# Patient Record
Sex: FEMALE | Race: WHITE | NOT HISPANIC OR LATINO | URBAN - NONMETROPOLITAN AREA
[De-identification: names, ages, dates, MRNs, and addresses within clinical notes are randomized per-mention and may not be internally consistent; named-entity substitution may affect disease eponyms.]

---

## 2021-02-24 ENCOUNTER — LAB (OUTPATIENT)
Dept: LAB | Facility: HOSPITAL | Age: 31
End: 2021-02-24

## 2021-02-24 ENCOUNTER — OFFICE VISIT (OUTPATIENT)
Dept: ENDOCRINOLOGY | Facility: CLINIC | Age: 31
End: 2021-02-24

## 2021-02-24 VITALS
HEART RATE: 69 BPM | SYSTOLIC BLOOD PRESSURE: 100 MMHG | DIASTOLIC BLOOD PRESSURE: 68 MMHG | WEIGHT: 168.4 LBS | OXYGEN SATURATION: 99 % | BODY MASS INDEX: 27.06 KG/M2 | HEIGHT: 66 IN

## 2021-02-24 DIAGNOSIS — R00.2 PALPITATION: ICD-10-CM

## 2021-02-24 DIAGNOSIS — R53.83 OTHER FATIGUE: ICD-10-CM

## 2021-02-24 DIAGNOSIS — R94.6 ABNORMAL THYROID FUNCTION TEST: Primary | ICD-10-CM

## 2021-02-24 LAB
ALBUMIN SERPL-MCNC: 4.3 G/DL (ref 3.5–5.2)
ALBUMIN/GLOB SERPL: 1.3 G/DL
ALP SERPL-CCNC: 68 U/L (ref 39–117)
ALT SERPL W P-5'-P-CCNC: 57 U/L (ref 1–33)
ANION GAP SERPL CALCULATED.3IONS-SCNC: 12.4 MMOL/L (ref 5–15)
AST SERPL-CCNC: 36 U/L (ref 1–32)
BASOPHILS # BLD AUTO: 0.03 10*3/MM3 (ref 0–0.2)
BASOPHILS NFR BLD AUTO: 0.3 % (ref 0–1.5)
BILIRUB SERPL-MCNC: 0.6 MG/DL (ref 0–1.2)
BUN SERPL-MCNC: 10 MG/DL (ref 6–20)
BUN/CREAT SERPL: 14.5 (ref 7–25)
CALCIUM SPEC-SCNC: 9.1 MG/DL (ref 8.6–10.5)
CHLORIDE SERPL-SCNC: 103 MMOL/L (ref 98–107)
CO2 SERPL-SCNC: 23.6 MMOL/L (ref 22–29)
CREAT SERPL-MCNC: 0.69 MG/DL (ref 0.57–1)
DEPRECATED RDW RBC AUTO: 38 FL (ref 37–54)
EOSINOPHIL # BLD AUTO: 0.13 10*3/MM3 (ref 0–0.4)
EOSINOPHIL NFR BLD AUTO: 1.5 % (ref 0.3–6.2)
ERYTHROCYTE [DISTWIDTH] IN BLOOD BY AUTOMATED COUNT: 13.3 % (ref 12.3–15.4)
GFR SERPL CREATININE-BSD FRML MDRD: 99 ML/MIN/1.73
GLOBULIN UR ELPH-MCNC: 3.2 GM/DL
GLUCOSE SERPL-MCNC: 87 MG/DL (ref 65–99)
HCT VFR BLD AUTO: 39.7 % (ref 34–46.6)
HGB BLD-MCNC: 13.2 G/DL (ref 12–15.9)
IMM GRANULOCYTES # BLD AUTO: 0.02 10*3/MM3 (ref 0–0.05)
IMM GRANULOCYTES NFR BLD AUTO: 0.2 % (ref 0–0.5)
LYMPHOCYTES # BLD AUTO: 2.3 10*3/MM3 (ref 0.7–3.1)
LYMPHOCYTES NFR BLD AUTO: 26.2 % (ref 19.6–45.3)
MCH RBC QN AUTO: 26.3 PG (ref 26.6–33)
MCHC RBC AUTO-ENTMCNC: 33.2 G/DL (ref 31.5–35.7)
MCV RBC AUTO: 79.2 FL (ref 79–97)
MONOCYTES # BLD AUTO: 0.63 10*3/MM3 (ref 0.1–0.9)
MONOCYTES NFR BLD AUTO: 7.2 % (ref 5–12)
NEUTROPHILS NFR BLD AUTO: 5.67 10*3/MM3 (ref 1.7–7)
NEUTROPHILS NFR BLD AUTO: 64.6 % (ref 42.7–76)
NRBC BLD AUTO-RTO: 0 /100 WBC (ref 0–0.2)
PLATELET # BLD AUTO: 252 10*3/MM3 (ref 140–450)
PMV BLD AUTO: 12.1 FL (ref 6–12)
POTASSIUM SERPL-SCNC: 3.8 MMOL/L (ref 3.5–5.2)
PROT SERPL-MCNC: 7.5 G/DL (ref 6–8.5)
RBC # BLD AUTO: 5.01 10*6/MM3 (ref 3.77–5.28)
SODIUM SERPL-SCNC: 139 MMOL/L (ref 136–145)
T3 SERPL-MCNC: 184 NG/DL (ref 80–200)
T4 FREE SERPL-MCNC: 1.92 NG/DL (ref 0.93–1.7)
T4 SERPL-MCNC: 9.25 MCG/DL (ref 4.5–11.7)
TSH SERPL DL<=0.05 MIU/L-ACNC: <0.005 UIU/ML (ref 0.27–4.2)
WBC # BLD AUTO: 8.78 10*3/MM3 (ref 3.4–10.8)

## 2021-02-24 PROCEDURE — 36415 COLL VENOUS BLD VENIPUNCTURE: CPT | Performed by: NURSE PRACTITIONER

## 2021-02-24 PROCEDURE — 86376 MICROSOMAL ANTIBODY EACH: CPT | Performed by: NURSE PRACTITIONER

## 2021-02-24 PROCEDURE — 84439 ASSAY OF FREE THYROXINE: CPT | Performed by: NURSE PRACTITIONER

## 2021-02-24 PROCEDURE — 84480 ASSAY TRIIODOTHYRONINE (T3): CPT | Performed by: NURSE PRACTITIONER

## 2021-02-24 PROCEDURE — 80053 COMPREHEN METABOLIC PANEL: CPT | Performed by: NURSE PRACTITIONER

## 2021-02-24 PROCEDURE — 83520 IMMUNOASSAY QUANT NOS NONAB: CPT | Performed by: NURSE PRACTITIONER

## 2021-02-24 PROCEDURE — 99204 OFFICE O/P NEW MOD 45 MIN: CPT | Performed by: NURSE PRACTITIONER

## 2021-02-24 PROCEDURE — 84443 ASSAY THYROID STIM HORMONE: CPT | Performed by: NURSE PRACTITIONER

## 2021-02-24 PROCEDURE — 84445 ASSAY OF TSI GLOBULIN: CPT | Performed by: NURSE PRACTITIONER

## 2021-02-24 PROCEDURE — 85025 COMPLETE CBC W/AUTO DIFF WBC: CPT | Performed by: NURSE PRACTITIONER

## 2021-02-24 PROCEDURE — 84436 ASSAY OF TOTAL THYROXINE: CPT | Performed by: NURSE PRACTITIONER

## 2021-02-24 RX ORDER — METHIMAZOLE 5 MG/1
5 TABLET ORAL DAILY
Qty: 30 TABLET | Refills: 11 | Status: SHIPPED | OUTPATIENT
Start: 2021-02-24 | End: 2021-03-25 | Stop reason: SDUPTHER

## 2021-02-24 RX ORDER — METOPROLOL SUCCINATE 25 MG/1
25 TABLET, EXTENDED RELEASE ORAL DAILY
COMMUNITY
Start: 2020-12-28 | End: 2021-03-25 | Stop reason: SDUPTHER

## 2021-02-24 NOTE — PROGRESS NOTES
"Chief Complaint  Hypothyroidism    Subjective          Sophie Ashraf presents to [facility] ENDOCRINOLOGY  History of Present Illness     In office visit     Referring provider Can Schuster MD       30 year old female presents for consultation       Reason abnormal thyroid function studies    Timing constant    Quality not controlled    Duration is December 2020    Context patient presented with heart palpitations that were going on for several weeks      Current symptoms--heart fluttering, fatigue       Quantity    December 28, 2020    TSH is less than 0.01    Free T4 is 2.5    Aggravating factors--none    Alleviating factors none    She was started on metoprolol by the urgent care        Review of Systems   Constitutional: Positive for fatigue.   Cardiovascular: Positive for palpitations.     Objective   Vital Signs:   /68   Pulse 69   Ht 167.6 cm (66\")   Wt 76.4 kg (168 lb 6.4 oz)   SpO2 99%   BMI 27.18 kg/m²     Physical Exam  Constitutional:       Appearance: Normal appearance.   HENT:      Head: Normocephalic.      Right Ear: External ear normal.      Left Ear: External ear normal.      Nose: Nose normal.   Eyes:      Conjunctiva/sclera: Conjunctivae normal.      Pupils: Pupils are equal, round, and reactive to light.   Neck:      Musculoskeletal: Neck supple.   Cardiovascular:      Rate and Rhythm: Regular rhythm.      Heart sounds: Normal heart sounds.   Pulmonary:      Breath sounds: Normal breath sounds.   Musculoskeletal: Normal range of motion.   Skin:     Coloration: Skin is not pale.   Neurological:      General: No focal deficit present.      Mental Status: She is alert.   Psychiatric:         Mood and Affect: Mood normal.         Thought Content: Thought content normal.        Result Review :   The following data was reviewed by: SANJANA Russo on 02/24/2021:                Assessment and Plan    Diagnoses and all orders for this visit:    1. " Abnormal thyroid function test (Primary)  -     T4, Free  -     TSH  -     Thyroid Peroxidase Antibody  -     Thyroid Stimulating Immunoglobulin  -     Thyrotropin Receptor Antibody  -     T4  -     T3  -     CBC & Differential  -     Comprehensive Metabolic Panel    2. Other fatigue    3. Palpitation             In summary Ms. Ashraf is a pleasant 30 year old female found to have abnormal thyroid function studies and has been referred for further evaluation.    PLAN:     Abnormal thyroid function studies    Diagnosis includes Graves disease     Repeat labs today TSH, free T4, T3, TSI and TPO ab    We discussed started methimazole     Side effects including possible liver affection and aplastic anemia discussed. Stop medication and call the office or go to the ER if fever, sore throat, RUQ pain, and yellowing of the skin       Palpitations    Improved  on metoprolol     Continue     Fatigue     Reassess thyroid studies     Records from  received and reviewed from 2020   Thank you for this consultation           Follow Up   Return in about 6 weeks (around 4/7/2021) for Recheck.  Patient was given instructions and counseling regarding her condition or for health maintenance advice. Please see specific information pulled into the AVS if appropriate.

## 2021-02-24 NOTE — PATIENT INSTRUCTIONS
Hyperthyroidism    Hyperthyroidism is when the thyroid gland is too active (overactive). The thyroid gland is a small gland located in the lower front part of the neck, just in front of the windpipe (trachea). This gland makes hormones that help control how the body uses food for energy (metabolism) as well as how the heart and brain function. These hormones also play a role in keeping your bones strong. When the thyroid is overactive, it produces too much of a hormone called thyroxine.  What are the causes?  This condition may be caused by:  · Graves' disease. This is a disorder in which the body's disease-fighting system (immune system) attacks the thyroid gland. This is the most common cause.  · Inflammation of the thyroid gland.  · A tumor in the thyroid gland.  · Use of certain medicines, including:  ? Prescription thyroid hormone replacement.  ? Herbal supplements that mimic thyroid hormones.  ? Amiodarone therapy.  · Solid or fluid-filled lumps within your thyroid gland (thyroid nodules).  · Taking in a large amount of iodine from foods or medicines.  What increases the risk?  You are more likely to develop this condition if:  · You are female.  · You have a family history of thyroid conditions.  · You smoke tobacco.  · You use a medicine called lithium.  · You take medicines that affect the immune system (immunosuppressants).  What are the signs or symptoms?  Symptoms of this condition include:  · Nervousness.  · Inability to tolerate heat.  · Unexplained weight loss.  · Diarrhea.  · Change in the texture of hair or skin.  · Heart skipping beats or making extra beats.  · Rapid heart rate.  · Loss of menstruation.  · Shaky hands.  · Fatigue.  · Restlessness.  · Sleep problems.  · Enlarged thyroid gland or a lump in the thyroid (nodule).  You may also have symptoms of Graves' disease, which may include:  · Protruding eyes.  · Dry eyes.  · Red or swollen eyes.  · Problems with vision.  How is this  diagnosed?  This condition may be diagnosed based on:  · Your symptoms and medical history.  · A physical exam.  · Blood tests.  · Thyroid ultrasound. This test involves using sound waves to produce images of the thyroid gland.  · A thyroid scan. A radioactive substance is injected into a vein, and images show how much iodine is present in the thyroid.  · Radioactive iodine uptake test (RAIU). A small amount of radioactive iodine is given by mouth to see how much iodine the thyroid absorbs after a certain amount of time.  How is this treated?  Treatment depends on the cause and severity of the condition. Treatment may include:  · Medicines to reduce the amount of thyroid hormone your body makes.  · Radioactive iodine treatment (radioiodine therapy). This involves swallowing a small dose of radioactive iodine, in capsule or liquid form, to kill thyroid cells.  · Surgery to remove part or all of your thyroid gland. You may need to take thyroid hormone replacement medicine for the rest of your life after thyroid surgery.  · Medicines to help manage your symptoms.  Follow these instructions at home:    · Take over-the-counter and prescription medicines only as told by your health care provider.  · Do not use any products that contain nicotine or tobacco, such as cigarettes and e-cigarettes. If you need help quitting, ask your health care provider.  · Follow any instructions from your health care provider about diet. You may be instructed to limit foods that contain iodine.  · Keep all follow-up visits as told by your health care provider. This is important.  ? You will need to have blood tests regularly so that your health care provider can monitor your condition.  Contact a health care provider if:  · Your symptoms do not get better with treatment.  · You have a fever.  · You are taking thyroid hormone replacement medicine and you:  ? Have symptoms of depression.  ? Feel like you are tired all the time.  ? Gain  weight.  Get help right away if:  · You have chest pain.  · You have decreased alertness or a change in your awareness.  · You have abdominal pain.  · You feel dizzy.  · You have a rapid heartbeat.  · You have an irregular heartbeat.  · You have difficulty breathing.  Summary  · The thyroid gland is a small gland located in the lower front part of the neck, just in front of the windpipe (trachea).  · Hyperthyroidism is when the thyroid gland is too active (overactive) and produces too much of a hormone called thyroxine.  · The most common cause is Graves' disease, a disorder in which your immune system attacks the thyroid gland.  · Hyperthyroidism can cause various symptoms, such as unexplained weight loss, nervousness, inability to tolerate heat, or changes in your heartbeat.  · Treatment may include medicine to reduce the amount of thyroid hormone your body makes, radioiodine therapy, surgery, or medicines to manage symptoms.  This information is not intended to replace advice given to you by your health care provider. Make sure you discuss any questions you have with your health care provider.  Document Revised: 11/30/2018 Document Reviewed: 11/28/2018  [email or web address] Patient Education © 2020 [employer]

## 2021-02-25 LAB — THYROPEROXIDASE AB SERPL-ACNC: 149 IU/ML (ref 0–34)

## 2021-02-27 LAB
TSH RECEP AB SER-ACNC: 2.81 IU/L (ref 0–1.75)
TSI SER-ACNC: 1.8 IU/L (ref 0–0.55)

## 2021-03-15 ENCOUNTER — TELEPHONE (OUTPATIENT)
Dept: ENDOCRINOLOGY | Facility: CLINIC | Age: 31
End: 2021-03-15

## 2021-03-15 DIAGNOSIS — R94.6 ABNORMAL THYROID FUNCTION TEST: Primary | ICD-10-CM

## 2021-03-15 NOTE — TELEPHONE ENCOUNTER
If pt needs labs she will need orders she wasn't sure since its like a 6 week appt we are rescheduling due to Migel wei    Call and let her know if Migel wants more labs

## 2021-03-25 RX ORDER — METOPROLOL SUCCINATE 25 MG/1
25 TABLET, EXTENDED RELEASE ORAL DAILY
Qty: 30 TABLET | Refills: 1 | Status: SHIPPED | OUTPATIENT
Start: 2021-03-25 | End: 2021-04-22

## 2021-03-25 RX ORDER — METHIMAZOLE 5 MG/1
5 TABLET ORAL DAILY
Qty: 30 TABLET | Refills: 11 | Status: SHIPPED | OUTPATIENT
Start: 2021-03-25 | End: 2021-11-01 | Stop reason: SDUPTHER

## 2021-04-19 ENCOUNTER — LAB (OUTPATIENT)
Dept: LAB | Facility: HOSPITAL | Age: 31
End: 2021-04-19

## 2021-04-19 DIAGNOSIS — R94.6 ABNORMAL THYROID FUNCTION TEST: ICD-10-CM

## 2021-04-19 PROCEDURE — 80053 COMPREHEN METABOLIC PANEL: CPT

## 2021-04-19 PROCEDURE — 85025 COMPLETE CBC W/AUTO DIFF WBC: CPT

## 2021-04-19 PROCEDURE — 82306 VITAMIN D 25 HYDROXY: CPT

## 2021-04-19 PROCEDURE — 84439 ASSAY OF FREE THYROXINE: CPT

## 2021-04-19 PROCEDURE — 84480 ASSAY TRIIODOTHYRONINE (T3): CPT

## 2021-04-19 PROCEDURE — 36415 COLL VENOUS BLD VENIPUNCTURE: CPT

## 2021-04-19 PROCEDURE — 84443 ASSAY THYROID STIM HORMONE: CPT

## 2021-04-20 LAB
25(OH)D3 SERPL-MCNC: 33.1 NG/ML
ALBUMIN SERPL-MCNC: 4.2 G/DL (ref 3.5–5.2)
ALBUMIN/GLOB SERPL: 1.6 G/DL
ALP SERPL-CCNC: 64 U/L (ref 39–117)
ALT SERPL W P-5'-P-CCNC: 21 U/L (ref 1–33)
ANION GAP SERPL CALCULATED.3IONS-SCNC: 8.6 MMOL/L (ref 5–15)
AST SERPL-CCNC: 20 U/L (ref 1–32)
BASOPHILS # BLD AUTO: 0.05 10*3/MM3 (ref 0–0.2)
BASOPHILS NFR BLD AUTO: 0.9 % (ref 0–1.5)
BILIRUB SERPL-MCNC: 1 MG/DL (ref 0–1.2)
BUN SERPL-MCNC: 10 MG/DL (ref 6–20)
BUN/CREAT SERPL: 13.5 (ref 7–25)
CALCIUM SPEC-SCNC: 9.2 MG/DL (ref 8.6–10.5)
CHLORIDE SERPL-SCNC: 105 MMOL/L (ref 98–107)
CO2 SERPL-SCNC: 24.4 MMOL/L (ref 22–29)
CREAT SERPL-MCNC: 0.74 MG/DL (ref 0.57–1)
DEPRECATED RDW RBC AUTO: 42.6 FL (ref 37–54)
EOSINOPHIL # BLD AUTO: 0.15 10*3/MM3 (ref 0–0.4)
EOSINOPHIL NFR BLD AUTO: 2.8 % (ref 0.3–6.2)
ERYTHROCYTE [DISTWIDTH] IN BLOOD BY AUTOMATED COUNT: 14.6 % (ref 12.3–15.4)
GFR SERPL CREATININE-BSD FRML MDRD: 92 ML/MIN/1.73
GLOBULIN UR ELPH-MCNC: 2.7 GM/DL
GLUCOSE SERPL-MCNC: 79 MG/DL (ref 65–99)
HCT VFR BLD AUTO: 40.1 % (ref 34–46.6)
HGB BLD-MCNC: 13.5 G/DL (ref 12–15.9)
IMM GRANULOCYTES # BLD AUTO: 0.02 10*3/MM3 (ref 0–0.05)
IMM GRANULOCYTES NFR BLD AUTO: 0.4 % (ref 0–0.5)
LYMPHOCYTES # BLD AUTO: 1.43 10*3/MM3 (ref 0.7–3.1)
LYMPHOCYTES NFR BLD AUTO: 26.6 % (ref 19.6–45.3)
MCH RBC QN AUTO: 27.3 PG (ref 26.6–33)
MCHC RBC AUTO-ENTMCNC: 33.7 G/DL (ref 31.5–35.7)
MCV RBC AUTO: 81.2 FL (ref 79–97)
MONOCYTES # BLD AUTO: 0.4 10*3/MM3 (ref 0.1–0.9)
MONOCYTES NFR BLD AUTO: 7.4 % (ref 5–12)
NEUTROPHILS NFR BLD AUTO: 3.33 10*3/MM3 (ref 1.7–7)
NEUTROPHILS NFR BLD AUTO: 61.9 % (ref 42.7–76)
NRBC BLD AUTO-RTO: 0 /100 WBC (ref 0–0.2)
PLATELET # BLD AUTO: 259 10*3/MM3 (ref 140–450)
PMV BLD AUTO: 12.2 FL (ref 6–12)
POTASSIUM SERPL-SCNC: 4.4 MMOL/L (ref 3.5–5.2)
PROT SERPL-MCNC: 6.9 G/DL (ref 6–8.5)
RBC # BLD AUTO: 4.94 10*6/MM3 (ref 3.77–5.28)
SODIUM SERPL-SCNC: 138 MMOL/L (ref 136–145)
T3 SERPL-MCNC: 87.4 NG/DL (ref 80–200)
T4 FREE SERPL-MCNC: 0.96 NG/DL (ref 0.93–1.7)
TSH SERPL DL<=0.05 MIU/L-ACNC: 0.36 UIU/ML (ref 0.27–4.2)
WBC # BLD AUTO: 5.38 10*3/MM3 (ref 3.4–10.8)

## 2021-04-22 ENCOUNTER — OFFICE VISIT (OUTPATIENT)
Dept: ENDOCRINOLOGY | Facility: CLINIC | Age: 31
End: 2021-04-22

## 2021-04-22 VITALS
OXYGEN SATURATION: 99 % | HEIGHT: 66 IN | WEIGHT: 172.2 LBS | BODY MASS INDEX: 27.68 KG/M2 | HEART RATE: 71 BPM | SYSTOLIC BLOOD PRESSURE: 104 MMHG | DIASTOLIC BLOOD PRESSURE: 82 MMHG

## 2021-04-22 DIAGNOSIS — E06.3 HASHIMOTO'S DISEASE: ICD-10-CM

## 2021-04-22 DIAGNOSIS — E05.00 GRAVES DISEASE: Primary | ICD-10-CM

## 2021-04-22 DIAGNOSIS — E05.90 HYPERTHYROIDISM: ICD-10-CM

## 2021-04-22 PROCEDURE — 99213 OFFICE O/P EST LOW 20 MIN: CPT | Performed by: NURSE PRACTITIONER

## 2021-04-22 NOTE — PROGRESS NOTES
"Chief Complaint  Thyroid Problem    Subjective          Sophie Ashraf presents to [facility] ENDOCRINOLOGY  History of Present Illness     In office visit      30-year-old female presents for follow-up    Reason hyperthyroidism due to Graves' disease    Timing constant     quality now controlled    Severity moderate         Duration is December 2020     Context patient presented with heart palpitations that were going on for several weeks        Current symptoms--heart fluttering, fatigue         Quantity         Lab Results   Component Value Date    TSH 0.364 04/19/2021        Aggravating factors--none     Alleviating factors appliance with methimazole             Review of Systems - General ROS: positive for  - fatigue                   Objective   Vital Signs:   /82   Pulse 71   Ht 167.6 cm (66\")   Wt 78.1 kg (172 lb 3.2 oz)   SpO2 99%   BMI 27.79 kg/m²     Physical Exam  Constitutional:       Appearance: Normal appearance.   HENT:      Head: Normocephalic.      Right Ear: External ear normal.      Left Ear: External ear normal.      Nose: Nose normal.   Eyes:      Conjunctiva/sclera: Conjunctivae normal.      Pupils: Pupils are equal, round, and reactive to light.   Cardiovascular:      Rate and Rhythm: Regular rhythm.      Heart sounds: Normal heart sounds.   Pulmonary:      Breath sounds: Normal breath sounds.   Musculoskeletal:         General: Normal range of motion.      Cervical back: Normal range of motion and neck supple.   Skin:     Coloration: Skin is not pale.   Neurological:      General: No focal deficit present.      Mental Status: She is alert.   Psychiatric:         Mood and Affect: Mood normal.         Thought Content: Thought content normal.         Judgment: Judgment normal.        Result Review :{Labs  Result Review  Imaging  Med Tab  Media :23}   The following data was reviewed by: SANJANA Russo on 04/22/2021:  Common labs    Common " Labsle 2/24/21 2/24/21 4/19/21 4/19/21    0848 0848 1224 1224   Glucose  87  79   BUN  10  10   Creatinine  0.69  0.74   eGFR Non African Am  99  92   Sodium  139  138   Potassium  3.8  4.4   Chloride  103  105   Calcium  9.1  9.2   Albumin  4.30  4.20   Total Bilirubin  0.6  1.0   Alkaline Phosphatase  68  64   AST (SGOT)  36 (A)  20   ALT (SGPT)  57 (A)  21   WBC 8.78  5.38    Hemoglobin 13.2  13.5    Hematocrit 39.7  40.1    Platelets 252  259    (A) Abnormal value                      Assessment and Plan    Diagnoses and all orders for this visit:    1. Graves disease (Primary)    2. Hyperthyroidism    3. Hashimoto's disease           Hyperthyroidism due to Graves' disease    Positive Hashimoto's antibodies    Currently taking methimazole 5 mg daily keep taking this dose         Component      Latest Ref Rng & Units 2/24/2021   Free T4      0.93 - 1.70 ng/dL 1.92 (H)   TSH Baseline      0.270 - 4.200 uIU/mL <0.005 (L)   Thyroid Peroxidase Antibody      0 - 34 IU/mL 149 (H)   Thyroid Stimulating Immunoglobulin      0.00 - 0.55 IU/L 1.80 (H)   Thyrotropin Receptor Antibody      0.00 - 1.75 IU/L 2.81 (H)   T4, Total      4.50 - 11.70 mcg/dL 9.25   T3, Total      80.0 - 200.0 ng/dl 184.0     Component      Latest Ref Rng & Units 4/19/2021   Free T4      0.93 - 1.70 ng/dL 0.96   T3, Total      80.0 - 200.0 ng/dl 87.4       Lab Results   Component Value Date    TSH 0.364 04/19/2021          Side effects including possible liver affection and aplastic anemia discussed. Stop medication and call the office or go to the ER if fever, sore throat, RUQ pain, and yellowing of the skin         Palpitations have resolved she has stopped the metoprolol            Fatigue      Improved       Follow Up   Return in about 3 months (around 7/22/2021) for Recheck.  Patient was given instructions and counseling regarding her condition or for health maintenance advice. Please see specific information pulled into the AVS if appropriate.

## 2021-05-17 RX ORDER — METOPROLOL SUCCINATE 25 MG/1
25 TABLET, EXTENDED RELEASE ORAL DAILY
Qty: 30 TABLET | Refills: 1 | Status: SHIPPED | OUTPATIENT
Start: 2021-05-17 | End: 2021-05-28 | Stop reason: SDUPTHER

## 2021-05-28 RX ORDER — METOPROLOL SUCCINATE 25 MG/1
25 TABLET, EXTENDED RELEASE ORAL DAILY
Qty: 90 TABLET | Refills: 1 | Status: SHIPPED | OUTPATIENT
Start: 2021-05-28 | End: 2022-08-08

## 2021-06-04 ENCOUNTER — LAB (OUTPATIENT)
Dept: LAB | Facility: HOSPITAL | Age: 31
End: 2021-06-04

## 2021-06-04 DIAGNOSIS — E05.00 GRAVES DISEASE: ICD-10-CM

## 2021-06-04 LAB
ALBUMIN SERPL-MCNC: 4.3 G/DL (ref 3.5–5.2)
ALBUMIN/GLOB SERPL: 1.6 G/DL
ALP SERPL-CCNC: 66 U/L (ref 39–117)
ALT SERPL W P-5'-P-CCNC: 13 U/L (ref 1–33)
ANION GAP SERPL CALCULATED.3IONS-SCNC: 11.1 MMOL/L (ref 5–15)
AST SERPL-CCNC: 16 U/L (ref 1–32)
BASOPHILS # BLD AUTO: 0.06 10*3/MM3 (ref 0–0.2)
BASOPHILS NFR BLD AUTO: 0.8 % (ref 0–1.5)
BILIRUB SERPL-MCNC: 0.3 MG/DL (ref 0–1.2)
BUN SERPL-MCNC: 11 MG/DL (ref 6–20)
BUN/CREAT SERPL: 12.5 (ref 7–25)
CALCIUM SPEC-SCNC: 8.9 MG/DL (ref 8.6–10.5)
CHLORIDE SERPL-SCNC: 104 MMOL/L (ref 98–107)
CO2 SERPL-SCNC: 24.9 MMOL/L (ref 22–29)
CREAT SERPL-MCNC: 0.88 MG/DL (ref 0.57–1)
DEPRECATED RDW RBC AUTO: 41.2 FL (ref 37–54)
EOSINOPHIL # BLD AUTO: 0.25 10*3/MM3 (ref 0–0.4)
EOSINOPHIL NFR BLD AUTO: 3.3 % (ref 0.3–6.2)
ERYTHROCYTE [DISTWIDTH] IN BLOOD BY AUTOMATED COUNT: 14.2 % (ref 12.3–15.4)
GFR SERPL CREATININE-BSD FRML MDRD: 75 ML/MIN/1.73
GLOBULIN UR ELPH-MCNC: 2.7 GM/DL
GLUCOSE SERPL-MCNC: 99 MG/DL (ref 65–99)
HCT VFR BLD AUTO: 37.8 % (ref 34–46.6)
HGB BLD-MCNC: 12.6 G/DL (ref 12–15.9)
IMM GRANULOCYTES # BLD AUTO: 0.02 10*3/MM3 (ref 0–0.05)
IMM GRANULOCYTES NFR BLD AUTO: 0.3 % (ref 0–0.5)
LYMPHOCYTES # BLD AUTO: 2.39 10*3/MM3 (ref 0.7–3.1)
LYMPHOCYTES NFR BLD AUTO: 31.1 % (ref 19.6–45.3)
MCH RBC QN AUTO: 27 PG (ref 26.6–33)
MCHC RBC AUTO-ENTMCNC: 33.3 G/DL (ref 31.5–35.7)
MCV RBC AUTO: 81.1 FL (ref 79–97)
MONOCYTES # BLD AUTO: 0.49 10*3/MM3 (ref 0.1–0.9)
MONOCYTES NFR BLD AUTO: 6.4 % (ref 5–12)
NEUTROPHILS NFR BLD AUTO: 4.47 10*3/MM3 (ref 1.7–7)
NEUTROPHILS NFR BLD AUTO: 58.1 % (ref 42.7–76)
NRBC BLD AUTO-RTO: 0 /100 WBC (ref 0–0.2)
PLATELET # BLD AUTO: 264 10*3/MM3 (ref 140–450)
PMV BLD AUTO: 12.2 FL (ref 6–12)
POTASSIUM SERPL-SCNC: 3.7 MMOL/L (ref 3.5–5.2)
PROT SERPL-MCNC: 7 G/DL (ref 6–8.5)
RBC # BLD AUTO: 4.66 10*6/MM3 (ref 3.77–5.28)
SODIUM SERPL-SCNC: 140 MMOL/L (ref 136–145)
T3 SERPL-MCNC: 89.3 NG/DL (ref 80–200)
T4 FREE SERPL-MCNC: 1.03 NG/DL (ref 0.93–1.7)
TSH SERPL DL<=0.05 MIU/L-ACNC: 4.56 UIU/ML (ref 0.27–4.2)
WBC # BLD AUTO: 7.68 10*3/MM3 (ref 3.4–10.8)

## 2021-06-04 PROCEDURE — 84480 ASSAY TRIIODOTHYRONINE (T3): CPT

## 2021-06-04 PROCEDURE — 84443 ASSAY THYROID STIM HORMONE: CPT

## 2021-06-04 PROCEDURE — 80053 COMPREHEN METABOLIC PANEL: CPT

## 2021-06-04 PROCEDURE — 84439 ASSAY OF FREE THYROXINE: CPT

## 2021-06-04 PROCEDURE — 85025 COMPLETE CBC W/AUTO DIFF WBC: CPT

## 2021-06-04 PROCEDURE — 36415 COLL VENOUS BLD VENIPUNCTURE: CPT

## 2021-07-22 ENCOUNTER — TELEPHONE (OUTPATIENT)
Dept: ENDOCRINOLOGY | Facility: CLINIC | Age: 31
End: 2021-07-22

## 2021-07-22 DIAGNOSIS — E05.90 HYPERTHYROIDISM: ICD-10-CM

## 2021-07-22 DIAGNOSIS — E55.9 VITAMIN D DEFICIENCY: ICD-10-CM

## 2021-07-22 DIAGNOSIS — E05.00 GRAVES DISEASE: Primary | ICD-10-CM

## 2021-08-02 ENCOUNTER — LAB (OUTPATIENT)
Dept: LAB | Facility: HOSPITAL | Age: 31
End: 2021-08-02

## 2021-08-02 PROCEDURE — 82306 VITAMIN D 25 HYDROXY: CPT | Performed by: NURSE PRACTITIONER

## 2021-08-02 PROCEDURE — 80053 COMPREHEN METABOLIC PANEL: CPT | Performed by: NURSE PRACTITIONER

## 2021-08-02 PROCEDURE — 84480 ASSAY TRIIODOTHYRONINE (T3): CPT | Performed by: NURSE PRACTITIONER

## 2021-08-02 PROCEDURE — 84443 ASSAY THYROID STIM HORMONE: CPT | Performed by: NURSE PRACTITIONER

## 2021-08-02 PROCEDURE — 85025 COMPLETE CBC W/AUTO DIFF WBC: CPT | Performed by: NURSE PRACTITIONER

## 2021-08-02 PROCEDURE — 84439 ASSAY OF FREE THYROXINE: CPT | Performed by: NURSE PRACTITIONER

## 2021-08-02 PROCEDURE — 36415 COLL VENOUS BLD VENIPUNCTURE: CPT | Performed by: NURSE PRACTITIONER

## 2021-08-03 LAB
25(OH)D3 SERPL-MCNC: 38.1 NG/ML
ALBUMIN SERPL-MCNC: 4.3 G/DL (ref 3.5–5.2)
ALBUMIN/GLOB SERPL: 1.4 G/DL
ALP SERPL-CCNC: 60 U/L (ref 39–117)
ALT SERPL W P-5'-P-CCNC: 13 U/L (ref 1–33)
ANION GAP SERPL CALCULATED.3IONS-SCNC: 11.3 MMOL/L (ref 5–15)
AST SERPL-CCNC: 17 U/L (ref 1–32)
BASOPHILS # BLD AUTO: 0.04 10*3/MM3 (ref 0–0.2)
BASOPHILS NFR BLD AUTO: 0.5 % (ref 0–1.5)
BILIRUB SERPL-MCNC: 0.4 MG/DL (ref 0–1.2)
BUN SERPL-MCNC: 9 MG/DL (ref 6–20)
BUN/CREAT SERPL: 11.3 (ref 7–25)
CALCIUM SPEC-SCNC: 9.2 MG/DL (ref 8.6–10.5)
CHLORIDE SERPL-SCNC: 103 MMOL/L (ref 98–107)
CO2 SERPL-SCNC: 23.7 MMOL/L (ref 22–29)
CREAT SERPL-MCNC: 0.8 MG/DL (ref 0.57–1)
DEPRECATED RDW RBC AUTO: 42.3 FL (ref 37–54)
EOSINOPHIL # BLD AUTO: 0.19 10*3/MM3 (ref 0–0.4)
EOSINOPHIL NFR BLD AUTO: 2.2 % (ref 0.3–6.2)
ERYTHROCYTE [DISTWIDTH] IN BLOOD BY AUTOMATED COUNT: 13.7 % (ref 12.3–15.4)
GFR SERPL CREATININE-BSD FRML MDRD: 84 ML/MIN/1.73
GLOBULIN UR ELPH-MCNC: 3 GM/DL
GLUCOSE SERPL-MCNC: 92 MG/DL (ref 65–99)
HCT VFR BLD AUTO: 41.2 % (ref 34–46.6)
HGB BLD-MCNC: 13.4 G/DL (ref 12–15.9)
IMM GRANULOCYTES # BLD AUTO: 0.03 10*3/MM3 (ref 0–0.05)
IMM GRANULOCYTES NFR BLD AUTO: 0.3 % (ref 0–0.5)
LYMPHOCYTES # BLD AUTO: 1.67 10*3/MM3 (ref 0.7–3.1)
LYMPHOCYTES NFR BLD AUTO: 19 % (ref 19.6–45.3)
MCH RBC QN AUTO: 27.5 PG (ref 26.6–33)
MCHC RBC AUTO-ENTMCNC: 32.5 G/DL (ref 31.5–35.7)
MCV RBC AUTO: 84.4 FL (ref 79–97)
MONOCYTES # BLD AUTO: 0.57 10*3/MM3 (ref 0.1–0.9)
MONOCYTES NFR BLD AUTO: 6.5 % (ref 5–12)
NEUTROPHILS NFR BLD AUTO: 6.3 10*3/MM3 (ref 1.7–7)
NEUTROPHILS NFR BLD AUTO: 71.5 % (ref 42.7–76)
NRBC BLD AUTO-RTO: 0 /100 WBC (ref 0–0.2)
PLATELET # BLD AUTO: 268 10*3/MM3 (ref 140–450)
PMV BLD AUTO: 12.5 FL (ref 6–12)
POTASSIUM SERPL-SCNC: 4 MMOL/L (ref 3.5–5.2)
PROT SERPL-MCNC: 7.3 G/DL (ref 6–8.5)
RBC # BLD AUTO: 4.88 10*6/MM3 (ref 3.77–5.28)
SODIUM SERPL-SCNC: 138 MMOL/L (ref 136–145)
T3 SERPL-MCNC: 78.6 NG/DL (ref 80–200)
T4 FREE SERPL-MCNC: 1.05 NG/DL (ref 0.93–1.7)
TSH SERPL DL<=0.05 MIU/L-ACNC: 1.7 UIU/ML (ref 0.27–4.2)
WBC # BLD AUTO: 8.8 10*3/MM3 (ref 3.4–10.8)

## 2021-08-06 ENCOUNTER — OFFICE VISIT (OUTPATIENT)
Dept: ENDOCRINOLOGY | Facility: CLINIC | Age: 31
End: 2021-08-06

## 2021-08-06 VITALS
WEIGHT: 173.9 LBS | BODY MASS INDEX: 27.95 KG/M2 | HEIGHT: 66 IN | HEART RATE: 81 BPM | DIASTOLIC BLOOD PRESSURE: 62 MMHG | OXYGEN SATURATION: 99 % | SYSTOLIC BLOOD PRESSURE: 112 MMHG | RESPIRATION RATE: 22 BRPM

## 2021-08-06 DIAGNOSIS — E05.90 HYPERTHYROIDISM: ICD-10-CM

## 2021-08-06 DIAGNOSIS — E05.00 GRAVES DISEASE: Primary | ICD-10-CM

## 2021-08-06 PROCEDURE — 99213 OFFICE O/P EST LOW 20 MIN: CPT | Performed by: NURSE PRACTITIONER

## 2021-08-06 NOTE — PROGRESS NOTES
"Chief Complaint  Follow-up (3 month)    Subjective          Sophie Ashraf presents to [facility] ENDOCRINOLOGY  History of Present Illness     In office visit         30-year-old female presents for follow-up     Reason hyperthyroidism due to Graves' disease     Timing constant      quality now controlled     Severity moderate           Duration is December 2020     Context patient presented with heart palpitations that were going on for several weeks                Quantity        Lab Results   Component Value Date    TSH 1.700 08/02/2021          Aggravating factors--none     Alleviating factors appliance with methimazole      Review of Systems - General ROS: negative                Objective   Vital Signs:   /62 (BP Location: Left arm, Patient Position: Sitting, Cuff Size: Adult)   Pulse 81   Resp 22   Ht 167.6 cm (66\")   Wt 78.9 kg (173 lb 14.4 oz)   SpO2 99%   BMI 28.07 kg/m²     Physical Exam  Constitutional:       Appearance: Normal appearance.   Cardiovascular:      Rate and Rhythm: Regular rhythm.      Heart sounds: Normal heart sounds.   Pulmonary:      Breath sounds: Normal breath sounds.   Musculoskeletal:         General: Normal range of motion.      Cervical back: Normal range of motion.   Skin:     Coloration: Skin is not pale.   Neurological:      Mental Status: She is alert.        Result Review :   The following data was reviewed by: SANJANA Russo on 08/06/2021:  Common labs    Common Labsle 4/19/21 4/19/21 6/4/21 6/4/21 8/2/21 8/2/21    1224 1224 1052 1052 1458 1458   Glucose  79  99  92   BUN  10  11  9   Creatinine  0.74  0.88  0.80   eGFR Non  Am  92  75  84   Sodium  138  140  138   Potassium  4.4  3.7  4.0   Chloride  105  104  103   Calcium  9.2  8.9  9.2   Albumin  4.20  4.30  4.30   Total Bilirubin  1.0  0.3  0.4   Alkaline Phosphatase  64  66  60   AST (SGOT)  20  16  17   ALT (SGPT)  21  13  13   WBC 5.38  7.68  8.80  "   Hemoglobin 13.5  12.6  13.4    Hematocrit 40.1  37.8  41.2    Platelets 259  264  268                      Assessment and Plan    Diagnoses and all orders for this visit:    1. Graves disease (Primary)  -     CBC & Differential; Standing  -     Comprehensive Metabolic Panel; Standing  -     T4, Free; Standing  -     TSH; Standing  -     T3; Standing    2. Hyperthyroidism  -     CBC & Differential; Standing  -     Comprehensive Metabolic Panel; Standing  -     T4, Free; Standing  -     TSH; Standing  -     T3; Standing             Hyperthyroidism due to Graves' disease     Positive Hashimoto's antibodies     Currently taking methimazole 5 mg daily---  Taking 1/2 daily            Component      Latest Ref Rng & Units 2/24/2021   Free T4      0.93 - 1.70 ng/dL 1.92 (H)   TSH Baseline      0.270 - 4.200 uIU/mL <0.005 (L)   Thyroid Peroxidase Antibody      0 - 34 IU/mL 149 (H)   Thyroid Stimulating Immunoglobulin      0.00 - 0.55 IU/L 1.80 (H)   Thyrotropin Receptor Antibody      0.00 - 1.75 IU/L 2.81 (H)   T4, Total      4.50 - 11.70 mcg/dL 9.25   T3, Total      80.0 - 200.0 ng/dl 184.0          Lab Results   Component Value Date    TSH 1.700 08/02/2021             Side effects including possible liver affection and aplastic anemia discussed. Stop medication and call the office or go to the ER if fever, sore throat, RUQ pain, and yellowing of the skin         Palpitations have resolved she has stopped the metoprolol              Fatigue      Improved                     Follow Up   Return in about 6 months (around 2/6/2022) for Recheck.  Patient was given instructions and counseling regarding her condition or for health maintenance advice. Please see specific information pulled into the AVS if appropriate.         This document has been electronically signed by SANJANA Russo on August 6, 2021 16:07 CDT.

## 2021-10-14 ENCOUNTER — LAB (OUTPATIENT)
Dept: LAB | Facility: HOSPITAL | Age: 31
End: 2021-10-14

## 2021-10-14 DIAGNOSIS — E05.90 HYPERTHYROIDISM: ICD-10-CM

## 2021-10-14 DIAGNOSIS — E05.00 GRAVES DISEASE: ICD-10-CM

## 2021-10-14 PROCEDURE — 84480 ASSAY TRIIODOTHYRONINE (T3): CPT

## 2021-10-14 PROCEDURE — 80053 COMPREHEN METABOLIC PANEL: CPT

## 2021-10-14 PROCEDURE — 84439 ASSAY OF FREE THYROXINE: CPT

## 2021-10-14 PROCEDURE — 85025 COMPLETE CBC W/AUTO DIFF WBC: CPT

## 2021-10-14 PROCEDURE — 84443 ASSAY THYROID STIM HORMONE: CPT

## 2021-10-14 PROCEDURE — 36415 COLL VENOUS BLD VENIPUNCTURE: CPT

## 2021-10-15 LAB
ALBUMIN SERPL-MCNC: 4.6 G/DL (ref 3.5–5.2)
ALBUMIN/GLOB SERPL: 1.9 G/DL
ALP SERPL-CCNC: 62 U/L (ref 39–117)
ALT SERPL W P-5'-P-CCNC: 16 U/L (ref 1–33)
ANION GAP SERPL CALCULATED.3IONS-SCNC: 10.8 MMOL/L (ref 5–15)
AST SERPL-CCNC: 12 U/L (ref 1–32)
BASOPHILS # BLD AUTO: 0.04 10*3/MM3 (ref 0–0.2)
BASOPHILS NFR BLD AUTO: 0.5 % (ref 0–1.5)
BILIRUB SERPL-MCNC: 0.6 MG/DL (ref 0–1.2)
BUN SERPL-MCNC: 10 MG/DL (ref 6–20)
BUN/CREAT SERPL: 12.3 (ref 7–25)
CALCIUM SPEC-SCNC: 9.7 MG/DL (ref 8.6–10.5)
CHLORIDE SERPL-SCNC: 104 MMOL/L (ref 98–107)
CO2 SERPL-SCNC: 24.2 MMOL/L (ref 22–29)
CREAT SERPL-MCNC: 0.81 MG/DL (ref 0.57–1)
DEPRECATED RDW RBC AUTO: 38.1 FL (ref 37–54)
EOSINOPHIL # BLD AUTO: 0.21 10*3/MM3 (ref 0–0.4)
EOSINOPHIL NFR BLD AUTO: 2.7 % (ref 0.3–6.2)
ERYTHROCYTE [DISTWIDTH] IN BLOOD BY AUTOMATED COUNT: 12.9 % (ref 12.3–15.4)
GFR SERPL CREATININE-BSD FRML MDRD: 82 ML/MIN/1.73
GLOBULIN UR ELPH-MCNC: 2.4 GM/DL
GLUCOSE SERPL-MCNC: 72 MG/DL (ref 65–99)
HCT VFR BLD AUTO: 37.3 % (ref 34–46.6)
HGB BLD-MCNC: 12.7 G/DL (ref 12–15.9)
IMM GRANULOCYTES # BLD AUTO: 0.02 10*3/MM3 (ref 0–0.05)
IMM GRANULOCYTES NFR BLD AUTO: 0.3 % (ref 0–0.5)
LYMPHOCYTES # BLD AUTO: 1.97 10*3/MM3 (ref 0.7–3.1)
LYMPHOCYTES NFR BLD AUTO: 25.4 % (ref 19.6–45.3)
MCH RBC QN AUTO: 28 PG (ref 26.6–33)
MCHC RBC AUTO-ENTMCNC: 34 G/DL (ref 31.5–35.7)
MCV RBC AUTO: 82.3 FL (ref 79–97)
MONOCYTES # BLD AUTO: 0.55 10*3/MM3 (ref 0.1–0.9)
MONOCYTES NFR BLD AUTO: 7.1 % (ref 5–12)
NEUTROPHILS NFR BLD AUTO: 4.97 10*3/MM3 (ref 1.7–7)
NEUTROPHILS NFR BLD AUTO: 64 % (ref 42.7–76)
NRBC BLD AUTO-RTO: 0 /100 WBC (ref 0–0.2)
PLATELET # BLD AUTO: 254 10*3/MM3 (ref 140–450)
PMV BLD AUTO: 12.2 FL (ref 6–12)
POTASSIUM SERPL-SCNC: 4.3 MMOL/L (ref 3.5–5.2)
PROT SERPL-MCNC: 7 G/DL (ref 6–8.5)
RBC # BLD AUTO: 4.53 10*6/MM3 (ref 3.77–5.28)
SODIUM SERPL-SCNC: 139 MMOL/L (ref 136–145)
T3 SERPL-MCNC: 94.6 NG/DL (ref 80–200)
T4 FREE SERPL-MCNC: 1.16 NG/DL (ref 0.93–1.7)
TSH SERPL DL<=0.05 MIU/L-ACNC: 2.02 UIU/ML (ref 0.27–4.2)
WBC # BLD AUTO: 7.76 10*3/MM3 (ref 3.4–10.8)

## 2021-10-20 ENCOUNTER — TELEPHONE (OUTPATIENT)
Dept: ENDOCRINOLOGY | Facility: CLINIC | Age: 31
End: 2021-10-20

## 2021-11-01 RX ORDER — METHIMAZOLE 5 MG/1
5 TABLET ORAL DAILY
Qty: 30 TABLET | Refills: 11 | Status: SHIPPED | OUTPATIENT
Start: 2021-11-01 | End: 2022-02-04 | Stop reason: SDUPTHER

## 2022-02-01 ENCOUNTER — LAB (OUTPATIENT)
Dept: LAB | Facility: HOSPITAL | Age: 32
End: 2022-02-01

## 2022-02-01 DIAGNOSIS — E05.90 HYPERTHYROIDISM: ICD-10-CM

## 2022-02-01 DIAGNOSIS — E05.00 GRAVES DISEASE: ICD-10-CM

## 2022-02-01 PROCEDURE — 36415 COLL VENOUS BLD VENIPUNCTURE: CPT

## 2022-02-01 PROCEDURE — 84480 ASSAY TRIIODOTHYRONINE (T3): CPT

## 2022-02-01 PROCEDURE — 80050 GENERAL HEALTH PANEL: CPT

## 2022-02-01 PROCEDURE — 84439 ASSAY OF FREE THYROXINE: CPT

## 2022-02-02 LAB
ALBUMIN SERPL-MCNC: 4.9 G/DL (ref 3.5–5.2)
ALBUMIN/GLOB SERPL: 2 G/DL
ALP SERPL-CCNC: 58 U/L (ref 39–117)
ALT SERPL W P-5'-P-CCNC: 11 U/L (ref 1–33)
ANION GAP SERPL CALCULATED.3IONS-SCNC: 11 MMOL/L (ref 5–15)
AST SERPL-CCNC: 13 U/L (ref 1–32)
BASOPHILS # BLD AUTO: 0.05 10*3/MM3 (ref 0–0.2)
BASOPHILS NFR BLD AUTO: 0.5 % (ref 0–1.5)
BILIRUB SERPL-MCNC: 0.2 MG/DL (ref 0–1.2)
BUN SERPL-MCNC: 16 MG/DL (ref 6–20)
BUN/CREAT SERPL: 21.6 (ref 7–25)
CALCIUM SPEC-SCNC: 9.5 MG/DL (ref 8.6–10.5)
CHLORIDE SERPL-SCNC: 104 MMOL/L (ref 98–107)
CO2 SERPL-SCNC: 24 MMOL/L (ref 22–29)
CREAT SERPL-MCNC: 0.74 MG/DL (ref 0.57–1)
DEPRECATED RDW RBC AUTO: 40.9 FL (ref 37–54)
EOSINOPHIL # BLD AUTO: 0.21 10*3/MM3 (ref 0–0.4)
EOSINOPHIL NFR BLD AUTO: 2.1 % (ref 0.3–6.2)
ERYTHROCYTE [DISTWIDTH] IN BLOOD BY AUTOMATED COUNT: 13.1 % (ref 12.3–15.4)
GFR SERPL CREATININE-BSD FRML MDRD: 91 ML/MIN/1.73
GLOBULIN UR ELPH-MCNC: 2.4 GM/DL
GLUCOSE SERPL-MCNC: 92 MG/DL (ref 65–99)
HCT VFR BLD AUTO: 40.8 % (ref 34–46.6)
HGB BLD-MCNC: 13.4 G/DL (ref 12–15.9)
IMM GRANULOCYTES # BLD AUTO: 0.02 10*3/MM3 (ref 0–0.05)
IMM GRANULOCYTES NFR BLD AUTO: 0.2 % (ref 0–0.5)
LYMPHOCYTES # BLD AUTO: 2.36 10*3/MM3 (ref 0.7–3.1)
LYMPHOCYTES NFR BLD AUTO: 24 % (ref 19.6–45.3)
MCH RBC QN AUTO: 28.5 PG (ref 26.6–33)
MCHC RBC AUTO-ENTMCNC: 32.8 G/DL (ref 31.5–35.7)
MCV RBC AUTO: 86.6 FL (ref 79–97)
MONOCYTES # BLD AUTO: 0.55 10*3/MM3 (ref 0.1–0.9)
MONOCYTES NFR BLD AUTO: 5.6 % (ref 5–12)
NEUTROPHILS NFR BLD AUTO: 6.64 10*3/MM3 (ref 1.7–7)
NEUTROPHILS NFR BLD AUTO: 67.6 % (ref 42.7–76)
NRBC BLD AUTO-RTO: 0 /100 WBC (ref 0–0.2)
PLATELET # BLD AUTO: 238 10*3/MM3 (ref 140–450)
PMV BLD AUTO: 12.5 FL (ref 6–12)
POTASSIUM SERPL-SCNC: 4.4 MMOL/L (ref 3.5–5.2)
PROT SERPL-MCNC: 7.3 G/DL (ref 6–8.5)
RBC # BLD AUTO: 4.71 10*6/MM3 (ref 3.77–5.28)
SODIUM SERPL-SCNC: 139 MMOL/L (ref 136–145)
T3 SERPL-MCNC: 80.3 NG/DL (ref 80–200)
T4 FREE SERPL-MCNC: 1.12 NG/DL (ref 0.93–1.7)
TSH SERPL DL<=0.05 MIU/L-ACNC: 1.41 UIU/ML (ref 0.27–4.2)
WBC NRBC COR # BLD: 9.83 10*3/MM3 (ref 3.4–10.8)

## 2022-02-04 ENCOUNTER — TELEMEDICINE (OUTPATIENT)
Dept: ENDOCRINOLOGY | Facility: CLINIC | Age: 32
End: 2022-02-04

## 2022-02-04 DIAGNOSIS — E06.3 HASHIMOTO'S DISEASE: ICD-10-CM

## 2022-02-04 DIAGNOSIS — E05.00 GRAVES DISEASE: ICD-10-CM

## 2022-02-04 DIAGNOSIS — E05.90 HYPERTHYROIDISM: Primary | ICD-10-CM

## 2022-02-04 PROCEDURE — 99213 OFFICE O/P EST LOW 20 MIN: CPT | Performed by: NURSE PRACTITIONER

## 2022-02-04 RX ORDER — METHIMAZOLE 5 MG/1
5 TABLET ORAL DAILY
Qty: 30 TABLET | Refills: 11 | Status: SHIPPED | OUTPATIENT
Start: 2022-02-04 | End: 2022-11-23 | Stop reason: HOSPADM

## 2022-02-04 NOTE — PROGRESS NOTES
Chief Complaint  Thyroid Problem    Subjective          Sophie Elaine Ashraf presents to [facility] ENDOCRINOLOGY  History of Present Illness       You have chosen to receive care through a telehealth visit.  Do you consent to use a video/audio connection for your medical care today? Yes          TELEHEALTH VIDEO VISIT     This a video visit due to [facility] current guidelines for social distancing due to the COVID 19 pandemic      31 year old female presents for follow up     Reason hyperthyroidism due to Graves     Diagnosed in Dec. 2020    Timing constant     Quality controlled    Severity moderate     Lab Results   Component Value Date    TSH 1.410 02/01/2022     Aggravating factors none     Alleviating factors compliance with medications            Objective   Vital Signs:   There were no vitals taken for this visit.    Physical Exam  Neurological:      General: No focal deficit present.      Mental Status: She is alert.   Psychiatric:         Mood and Affect: Mood normal.         Thought Content: Thought content normal.         Judgment: Judgment normal.        Result Review :   The following data was reviewed by: SANJANA Russo on 02/04/2022:  Common labs    Common Labsle 8/2/21 8/2/21 10/14/21 10/14/21 2/1/22 2/1/22    1458 1458 1445 1445 1549 1549   Glucose  92  72  92   BUN  9  10  16   Creatinine  0.80  0.81  0.74   eGFR Non  Am  84  82  91   Sodium  138  139  139   Potassium  4.0  4.3  4.4   Chloride  103  104  104   Calcium  9.2  9.7  9.5   Albumin  4.30  4.60  4.90   Total Bilirubin  0.4  0.6  0.2   Alkaline Phosphatase  60  62  58   AST (SGOT)  17  12  13   ALT (SGPT)  13  16  11   WBC 8.80  7.76  9.83    Hemoglobin 13.4  12.7  13.4    Hematocrit 41.2  37.3  40.8    Platelets 268  254  238                      Assessment and Plan    Diagnoses and all orders for this visit:    1. Hyperthyroidism (Primary)  -     CBC & Differential; Future  -     Comprehensive  Metabolic Panel; Future  -     TSH; Future  -     T4, Free; Future  -     T3; Future    2. Graves disease  -     CBC & Differential; Future  -     Comprehensive Metabolic Panel; Future  -     TSH; Future  -     T4, Free; Future  -     T3; Future    3. Hashimoto's disease    Other orders  -     methIMAzole (TAPAZOLE) 5 MG tablet; Take 1 tablet by mouth Daily.  Dispense: 30 tablet; Refill: 11           Hyperthyroidism due to Graves' disease     Positive Hashimoto's antibodies     Currently taking methimazole 5 mg daily---  Taking 1/2 daily            Component      Latest Ref Rng & Units 2/24/2021   Free T4      0.93 - 1.70 ng/dL 1.92 (H)   TSH Baseline      0.270 - 4.200 uIU/mL <0.005 (L)   Thyroid Peroxidase Antibody      0 - 34 IU/mL 149 (H)   Thyroid Stimulating Immunoglobulin      0.00 - 0.55 IU/L 1.80 (H)   Thyrotropin Receptor Antibody      0.00 - 1.75 IU/L 2.81 (H)   T4, Total      4.50 - 11.70 mcg/dL 9.25   T3, Total      80.0 - 200.0 ng/dl 184.0            Component      Latest Ref Rng & Units 2/1/2022   Free T4      0.93 - 1.70 ng/dL 1.12   TSH Baseline      0.270 - 4.200 uIU/mL 1.410   T3, Total      80.0 - 200.0 ng/dl 80.3               Side effects including possible liver affection and aplastic anemia discussed. Stop medication and call the office or go to the ER if fever, sore throat, RUQ pain, and yellowing of the skin         Palpitations have resolved she has stopped the metoprolol              Fatigue      Improved                 Follow Up   Return in about 6 months (around 8/4/2022) for Recheck.  Patient was given instructions and counseling regarding her condition or for health maintenance advice. Please see specific information pulled into the AVS if appropriate.         This document has been electronically signed by SANJANA Russo on February 4, 2022 11:29 CST.

## 2022-08-04 ENCOUNTER — LAB (OUTPATIENT)
Dept: LAB | Facility: HOSPITAL | Age: 32
End: 2022-08-04

## 2022-08-04 DIAGNOSIS — E05.00 GRAVES DISEASE: ICD-10-CM

## 2022-08-04 DIAGNOSIS — E05.90 HYPERTHYROIDISM: ICD-10-CM

## 2022-08-04 LAB
ALBUMIN SERPL-MCNC: 4.7 G/DL (ref 3.5–5.2)
ALBUMIN/GLOB SERPL: 1.7 G/DL
ALP SERPL-CCNC: 56 U/L (ref 39–117)
ALT SERPL W P-5'-P-CCNC: 12 U/L (ref 1–33)
ANION GAP SERPL CALCULATED.3IONS-SCNC: 12 MMOL/L (ref 5–15)
AST SERPL-CCNC: 18 U/L (ref 1–32)
BASOPHILS # BLD AUTO: 0.05 10*3/MM3 (ref 0–0.2)
BASOPHILS NFR BLD AUTO: 0.7 % (ref 0–1.5)
BILIRUB SERPL-MCNC: 0.3 MG/DL (ref 0–1.2)
BUN SERPL-MCNC: 14 MG/DL (ref 6–20)
BUN/CREAT SERPL: 17.1 (ref 7–25)
CALCIUM SPEC-SCNC: 9.1 MG/DL (ref 8.6–10.5)
CHLORIDE SERPL-SCNC: 101 MMOL/L (ref 98–107)
CO2 SERPL-SCNC: 26 MMOL/L (ref 22–29)
CREAT SERPL-MCNC: 0.82 MG/DL (ref 0.57–1)
DEPRECATED RDW RBC AUTO: 42.3 FL (ref 37–54)
EGFRCR SERPLBLD CKD-EPI 2021: 97.6 ML/MIN/1.73
EOSINOPHIL # BLD AUTO: 0.26 10*3/MM3 (ref 0–0.4)
EOSINOPHIL NFR BLD AUTO: 3.4 % (ref 0.3–6.2)
ERYTHROCYTE [DISTWIDTH] IN BLOOD BY AUTOMATED COUNT: 13.9 % (ref 12.3–15.4)
GLOBULIN UR ELPH-MCNC: 2.7 GM/DL
GLUCOSE SERPL-MCNC: 98 MG/DL (ref 65–99)
HCT VFR BLD AUTO: 37.6 % (ref 34–46.6)
HGB BLD-MCNC: 12.2 G/DL (ref 12–15.9)
IMM GRANULOCYTES # BLD AUTO: 0.03 10*3/MM3 (ref 0–0.05)
IMM GRANULOCYTES NFR BLD AUTO: 0.4 % (ref 0–0.5)
LYMPHOCYTES # BLD AUTO: 2.31 10*3/MM3 (ref 0.7–3.1)
LYMPHOCYTES NFR BLD AUTO: 30.2 % (ref 19.6–45.3)
MCH RBC QN AUTO: 27.4 PG (ref 26.6–33)
MCHC RBC AUTO-ENTMCNC: 32.4 G/DL (ref 31.5–35.7)
MCV RBC AUTO: 84.3 FL (ref 79–97)
MONOCYTES # BLD AUTO: 0.58 10*3/MM3 (ref 0.1–0.9)
MONOCYTES NFR BLD AUTO: 7.6 % (ref 5–12)
NEUTROPHILS NFR BLD AUTO: 4.42 10*3/MM3 (ref 1.7–7)
NEUTROPHILS NFR BLD AUTO: 57.7 % (ref 42.7–76)
NRBC BLD AUTO-RTO: 0 /100 WBC (ref 0–0.2)
PLATELET # BLD AUTO: 250 10*3/MM3 (ref 140–450)
PMV BLD AUTO: 12 FL (ref 6–12)
POTASSIUM SERPL-SCNC: 4.5 MMOL/L (ref 3.5–5.2)
PROT SERPL-MCNC: 7.4 G/DL (ref 6–8.5)
RBC # BLD AUTO: 4.46 10*6/MM3 (ref 3.77–5.28)
SODIUM SERPL-SCNC: 139 MMOL/L (ref 136–145)
WBC NRBC COR # BLD: 7.65 10*3/MM3 (ref 3.4–10.8)

## 2022-08-04 PROCEDURE — 36415 COLL VENOUS BLD VENIPUNCTURE: CPT

## 2022-08-04 PROCEDURE — 84480 ASSAY TRIIODOTHYRONINE (T3): CPT

## 2022-08-04 PROCEDURE — 80050 GENERAL HEALTH PANEL: CPT

## 2022-08-04 PROCEDURE — 84439 ASSAY OF FREE THYROXINE: CPT

## 2022-08-05 LAB
T3 SERPL-MCNC: 91.3 NG/DL (ref 80–200)
T4 FREE SERPL-MCNC: 1.14 NG/DL (ref 0.93–1.7)
TSH SERPL DL<=0.05 MIU/L-ACNC: 1.66 UIU/ML (ref 0.27–4.2)

## 2022-08-08 ENCOUNTER — OFFICE VISIT (OUTPATIENT)
Dept: ENDOCRINOLOGY | Facility: CLINIC | Age: 32
End: 2022-08-08

## 2022-08-08 VITALS
BODY MASS INDEX: 26.79 KG/M2 | DIASTOLIC BLOOD PRESSURE: 80 MMHG | OXYGEN SATURATION: 99 % | WEIGHT: 166.7 LBS | SYSTOLIC BLOOD PRESSURE: 118 MMHG | HEART RATE: 79 BPM | HEIGHT: 66 IN

## 2022-08-08 DIAGNOSIS — E06.3 HASHIMOTO'S DISEASE: ICD-10-CM

## 2022-08-08 DIAGNOSIS — E05.00 GRAVES DISEASE: ICD-10-CM

## 2022-08-08 DIAGNOSIS — E05.90 HYPERTHYROIDISM: Primary | ICD-10-CM

## 2022-08-08 PROCEDURE — 99213 OFFICE O/P EST LOW 20 MIN: CPT | Performed by: NURSE PRACTITIONER

## 2022-08-08 NOTE — PROGRESS NOTES
"Chief Complaint  Thyroid Problem    Subjective          Sophie Ashraf presents to [facility] ENDOCRINOLOGY  History of Present Illness         In office visit    31 year old female presents for follow up     Reason hyperthyroidism due to Graves     Diagnosed in Dec. 2020    Timing constant     Quality controlled    Severity moderate     Lab Results   Component Value Date    TSH 1.660 08/04/2022     Aggravating factors none     Alleviating factors compliance with medications       Review of Systems - General ROS: negative             Objective   Vital Signs:   /80   Pulse 79   Ht 167.6 cm (66\")   Wt 75.6 kg (166 lb 11.2 oz)   SpO2 99%   BMI 26.91 kg/m²     Physical Exam  Neurological:      General: No focal deficit present.      Mental Status: She is alert.   Psychiatric:         Mood and Affect: Mood normal.         Thought Content: Thought content normal.         Judgment: Judgment normal.        Result Review :   The following data was reviewed by: SANJANA Russo on 02/04/2022:  Common labs    Common Labsle 10/14/21 10/14/21 2/1/22 2/1/22 8/4/22 8/4/22    1445 1445 1549 1549 1553 1553   Glucose  72  92  98   BUN  10  16  14   Creatinine  0.81  0.74  0.82   eGFR Non African Am  82  91     Sodium  139  139  139   Potassium  4.3  4.4  4.5   Chloride  104  104  101   Calcium  9.7  9.5  9.1   Albumin  4.60  4.90  4.70   Total Bilirubin  0.6  0.2  0.3   Alkaline Phosphatase  62  58  56   AST (SGOT)  12  13  18   ALT (SGPT)  16  11  12   WBC 7.76  9.83  7.65    Hemoglobin 12.7  13.4  12.2    Hematocrit 37.3  40.8  37.6    Platelets 254  238  250                        Assessment and Plan    Diagnoses and all orders for this visit:    1. Hyperthyroidism (Primary)  -     Thyroid Stimulating Immunoglobulin; Future  -     TSH; Future  -     T4, Free; Future  -     T3; Future  -     CBC & Differential; Future  -     Comprehensive Metabolic Panel; Future    2. Graves " disease  -     Thyroid Stimulating Immunoglobulin; Future  -     TSH; Future  -     T4, Free; Future  -     T3; Future  -     CBC & Differential; Future  -     Comprehensive Metabolic Panel; Future    3. Hashimoto's disease           Hyperthyroidism due to Graves' disease     Positive Hashimoto's antibodies     Currently taking methimazole 5 mg daily---  Taking 1/2 daily            Component      Latest Ref Rng & Units 2/24/2021   Free T4      0.93 - 1.70 ng/dL 1.92 (H)   TSH Baseline      0.270 - 4.200 uIU/mL <0.005 (L)   Thyroid Peroxidase Antibody      0 - 34 IU/mL 149 (H)   Thyroid Stimulating Immunoglobulin      0.00 - 0.55 IU/L 1.80 (H)   Thyrotropin Receptor Antibody      0.00 - 1.75 IU/L 2.81 (H)   T4, Total      4.50 - 11.70 mcg/dL 9.25   T3, Total      80.0 - 200.0 ng/dl 184.0            Component      Latest Ref Rng & Units 8/4/2022   Free T4      0.93 - 1.70 ng/dL 1.14   TSH Baseline      0.270 - 4.200 uIU/mL 1.660   T3, Total      80.0 - 200.0 ng/dl 91.3             Side effects including possible liver affection and aplastic anemia discussed. Stop medication and call the office or go to the ER if fever, sore throat, RUQ pain, and yellowing of the skin         Palpitations have resolved she has stopped the metoprolol                                Follow Up   No follow-ups on file.  Patient was given instructions and counseling regarding her condition or for health maintenance advice. Please see specific information pulled into the AVS if appropriate.         This document has been electronically signed by SANJANA Russo on August 8, 2022 10:41 CDT.

## 2022-10-20 ENCOUNTER — LAB (OUTPATIENT)
Dept: LAB | Facility: HOSPITAL | Age: 32
End: 2022-10-20

## 2022-10-20 DIAGNOSIS — E05.00 GRAVES DISEASE: ICD-10-CM

## 2022-10-20 DIAGNOSIS — E05.90 HYPERTHYROIDISM: ICD-10-CM

## 2022-10-20 LAB
ALBUMIN SERPL-MCNC: 4.7 G/DL (ref 3.5–5.2)
ALBUMIN/GLOB SERPL: 1.6 G/DL
ALP SERPL-CCNC: 55 U/L (ref 39–117)
ALT SERPL W P-5'-P-CCNC: 12 U/L (ref 1–33)
ANION GAP SERPL CALCULATED.3IONS-SCNC: 11 MMOL/L (ref 5–15)
AST SERPL-CCNC: 17 U/L (ref 1–32)
BASOPHILS # BLD AUTO: 0.06 10*3/MM3 (ref 0–0.2)
BASOPHILS NFR BLD AUTO: 0.7 % (ref 0–1.5)
BILIRUB SERPL-MCNC: 0.4 MG/DL (ref 0–1.2)
BUN SERPL-MCNC: 12 MG/DL (ref 6–20)
BUN/CREAT SERPL: 16.2 (ref 7–25)
CALCIUM SPEC-SCNC: 9.2 MG/DL (ref 8.6–10.5)
CHLORIDE SERPL-SCNC: 102 MMOL/L (ref 98–107)
CO2 SERPL-SCNC: 25 MMOL/L (ref 22–29)
CREAT SERPL-MCNC: 0.74 MG/DL (ref 0.57–1)
DEPRECATED RDW RBC AUTO: 41.1 FL (ref 37–54)
EGFRCR SERPLBLD CKD-EPI 2021: 110.4 ML/MIN/1.73
EOSINOPHIL # BLD AUTO: 0.19 10*3/MM3 (ref 0–0.4)
EOSINOPHIL NFR BLD AUTO: 2.1 % (ref 0.3–6.2)
ERYTHROCYTE [DISTWIDTH] IN BLOOD BY AUTOMATED COUNT: 13.3 % (ref 12.3–15.4)
GLOBULIN UR ELPH-MCNC: 3 GM/DL
GLUCOSE SERPL-MCNC: 82 MG/DL (ref 65–99)
HCT VFR BLD AUTO: 39.4 % (ref 34–46.6)
HGB BLD-MCNC: 12.9 G/DL (ref 12–15.9)
IMM GRANULOCYTES # BLD AUTO: 0.02 10*3/MM3 (ref 0–0.05)
IMM GRANULOCYTES NFR BLD AUTO: 0.2 % (ref 0–0.5)
LYMPHOCYTES # BLD AUTO: 2.17 10*3/MM3 (ref 0.7–3.1)
LYMPHOCYTES NFR BLD AUTO: 23.6 % (ref 19.6–45.3)
MCH RBC QN AUTO: 27.6 PG (ref 26.6–33)
MCHC RBC AUTO-ENTMCNC: 32.7 G/DL (ref 31.5–35.7)
MCV RBC AUTO: 84.4 FL (ref 79–97)
MONOCYTES # BLD AUTO: 0.53 10*3/MM3 (ref 0.1–0.9)
MONOCYTES NFR BLD AUTO: 5.8 % (ref 5–12)
NEUTROPHILS NFR BLD AUTO: 6.23 10*3/MM3 (ref 1.7–7)
NEUTROPHILS NFR BLD AUTO: 67.6 % (ref 42.7–76)
NRBC BLD AUTO-RTO: 0 /100 WBC (ref 0–0.2)
PLATELET # BLD AUTO: 256 10*3/MM3 (ref 140–450)
PMV BLD AUTO: 11.2 FL (ref 6–12)
POTASSIUM SERPL-SCNC: 4.1 MMOL/L (ref 3.5–5.2)
PROT SERPL-MCNC: 7.7 G/DL (ref 6–8.5)
RBC # BLD AUTO: 4.67 10*6/MM3 (ref 3.77–5.28)
SODIUM SERPL-SCNC: 138 MMOL/L (ref 136–145)
T4 FREE SERPL-MCNC: 1.26 NG/DL (ref 0.93–1.7)
TSH SERPL DL<=0.05 MIU/L-ACNC: 2.29 UIU/ML (ref 0.27–4.2)
WBC NRBC COR # BLD: 9.2 10*3/MM3 (ref 3.4–10.8)

## 2022-10-20 PROCEDURE — 80050 GENERAL HEALTH PANEL: CPT

## 2022-10-20 PROCEDURE — 84480 ASSAY TRIIODOTHYRONINE (T3): CPT

## 2022-10-20 PROCEDURE — 36415 COLL VENOUS BLD VENIPUNCTURE: CPT

## 2022-10-20 PROCEDURE — 84445 ASSAY OF TSI GLOBULIN: CPT

## 2022-10-20 PROCEDURE — 84439 ASSAY OF FREE THYROXINE: CPT

## 2022-10-21 ENCOUNTER — TELEPHONE (OUTPATIENT)
Dept: ENDOCRINOLOGY | Facility: CLINIC | Age: 32
End: 2022-10-21

## 2022-10-21 LAB — T3 SERPL-MCNC: 80.5 NG/DL (ref 80–200)

## 2022-10-21 NOTE — TELEPHONE ENCOUNTER
----- Message from SANJANA Russo sent at 10/20/2022  5:46 PM CDT -----  Please let her know thyroid levels are normal

## 2022-10-24 LAB — TSI SER-ACNC: <0.1 IU/L (ref 0–0.55)

## 2022-11-11 ENCOUNTER — TELEPHONE (OUTPATIENT)
Dept: ENDOCRINOLOGY | Facility: CLINIC | Age: 32
End: 2022-11-11

## 2022-11-11 DIAGNOSIS — E05.90 HYPERTHYROIDISM: Primary | ICD-10-CM

## 2022-11-23 ENCOUNTER — INITIAL PRENATAL (OUTPATIENT)
Dept: OBSTETRICS AND GYNECOLOGY | Facility: CLINIC | Age: 32
End: 2022-11-23

## 2022-11-23 ENCOUNTER — LAB (OUTPATIENT)
Dept: LAB | Facility: HOSPITAL | Age: 32
End: 2022-11-23

## 2022-11-23 VITALS — WEIGHT: 165.2 LBS | SYSTOLIC BLOOD PRESSURE: 108 MMHG | BODY MASS INDEX: 26.66 KG/M2 | DIASTOLIC BLOOD PRESSURE: 78 MMHG

## 2022-11-23 DIAGNOSIS — O36.80X0 ENCOUNTER TO DETERMINE FETAL VIABILITY OF PREGNANCY, SINGLE OR UNSPECIFIED FETUS: ICD-10-CM

## 2022-11-23 DIAGNOSIS — E05.90 HYPERTHYROIDISM: ICD-10-CM

## 2022-11-23 DIAGNOSIS — Z32.00 PREGNANCY EXAMINATION OR TEST, PREGNANCY UNCONFIRMED: ICD-10-CM

## 2022-11-23 DIAGNOSIS — Z32.01 POSITIVE PREGNANCY TEST: ICD-10-CM

## 2022-11-23 DIAGNOSIS — Z34.00 SUPERVISION OF NORMAL FIRST PREGNANCY, ANTEPARTUM: Primary | ICD-10-CM

## 2022-11-23 DIAGNOSIS — Z36.87 UNSURE OF LMP (LAST MENSTRUAL PERIOD) AS REASON FOR ULTRASOUND SCAN: ICD-10-CM

## 2022-11-23 LAB
ABO GROUP BLD: NORMAL
AMPHET+METHAMPHET UR QL: NEGATIVE
AMPHETAMINES UR QL: NEGATIVE
B-HCG UR QL: POSITIVE
BARBITURATES UR QL SCN: NEGATIVE
BASOPHILS # BLD AUTO: 0.04 10*3/MM3 (ref 0–0.2)
BASOPHILS NFR BLD AUTO: 0.4 % (ref 0–1.5)
BENZODIAZ UR QL SCN: NEGATIVE
BILIRUB UR QL STRIP: NEGATIVE
BLD GP AB SCN SERPL QL: NEGATIVE
BUPRENORPHINE SERPL-MCNC: NEGATIVE NG/ML
CANNABINOIDS SERPL QL: NEGATIVE
CLARITY UR: CLEAR
COCAINE UR QL: NEGATIVE
COLOR UR: YELLOW
DEPRECATED RDW RBC AUTO: 40.5 FL (ref 37–54)
EOSINOPHIL # BLD AUTO: 0.14 10*3/MM3 (ref 0–0.4)
EOSINOPHIL NFR BLD AUTO: 1.5 % (ref 0.3–6.2)
ERYTHROCYTE [DISTWIDTH] IN BLOOD BY AUTOMATED COUNT: 13.3 % (ref 12.3–15.4)
EXPIRATION DATE: ABNORMAL
GLUCOSE UR STRIP-MCNC: NEGATIVE MG/DL
HBV SURFACE AG SERPL QL IA: NORMAL
HCG INTACT+B SERPL-ACNC: NORMAL MIU/ML
HCT VFR BLD AUTO: 38.7 % (ref 34–46.6)
HCV AB SER DONR QL: NORMAL
HGB BLD-MCNC: 12.8 G/DL (ref 12–15.9)
HGB UR QL STRIP.AUTO: NEGATIVE
HIV1+2 AB SER QL: NORMAL
IMM GRANULOCYTES # BLD AUTO: 0.04 10*3/MM3 (ref 0–0.05)
IMM GRANULOCYTES NFR BLD AUTO: 0.4 % (ref 0–0.5)
INTERNAL NEGATIVE CONTROL: NEGATIVE
INTERNAL POSITIVE CONTROL: POSITIVE
KETONES UR QL STRIP: NEGATIVE
LEUKOCYTE ESTERASE UR QL STRIP.AUTO: ABNORMAL
LYMPHOCYTES # BLD AUTO: 2.08 10*3/MM3 (ref 0.7–3.1)
LYMPHOCYTES NFR BLD AUTO: 22.9 % (ref 19.6–45.3)
Lab: ABNORMAL
Lab: NORMAL
MCH RBC QN AUTO: 27.9 PG (ref 26.6–33)
MCHC RBC AUTO-ENTMCNC: 33.1 G/DL (ref 31.5–35.7)
MCV RBC AUTO: 84.3 FL (ref 79–97)
METHADONE UR QL SCN: NEGATIVE
MONOCYTES # BLD AUTO: 0.57 10*3/MM3 (ref 0.1–0.9)
MONOCYTES NFR BLD AUTO: 6.3 % (ref 5–12)
NEUTROPHILS NFR BLD AUTO: 6.21 10*3/MM3 (ref 1.7–7)
NEUTROPHILS NFR BLD AUTO: 68.5 % (ref 42.7–76)
NITRITE UR QL STRIP: NEGATIVE
NRBC BLD AUTO-RTO: 0 /100 WBC (ref 0–0.2)
OPIATES UR QL: NEGATIVE
OXYCODONE UR QL SCN: NEGATIVE
PCP UR QL SCN: NEGATIVE
PH UR STRIP.AUTO: 6.5 [PH] (ref 5–8)
PLATELET # BLD AUTO: 303 10*3/MM3 (ref 140–450)
PMV BLD AUTO: 11.7 FL (ref 6–12)
PROPOXYPH UR QL: NEGATIVE
PROT UR QL STRIP: NEGATIVE
RBC # BLD AUTO: 4.59 10*6/MM3 (ref 3.77–5.28)
RH BLD: POSITIVE
RPR SER QL: NORMAL
SP GR UR STRIP: <1.005 (ref 1–1.03)
TRICYCLICS UR QL SCN: NEGATIVE
UROBILINOGEN UR QL STRIP: ABNORMAL
WBC NRBC COR # BLD: 9.08 10*3/MM3 (ref 3.4–10.8)

## 2022-11-23 PROCEDURE — 87491 CHLMYD TRACH DNA AMP PROBE: CPT | Performed by: STUDENT IN AN ORGANIZED HEALTH CARE EDUCATION/TRAINING PROGRAM

## 2022-11-23 PROCEDURE — 80081 OBSTETRIC PANEL INC HIV TSTG: CPT | Performed by: STUDENT IN AN ORGANIZED HEALTH CARE EDUCATION/TRAINING PROGRAM

## 2022-11-23 PROCEDURE — 84702 CHORIONIC GONADOTROPIN TEST: CPT | Performed by: STUDENT IN AN ORGANIZED HEALTH CARE EDUCATION/TRAINING PROGRAM

## 2022-11-23 PROCEDURE — 81003 URINALYSIS AUTO W/O SCOPE: CPT | Performed by: STUDENT IN AN ORGANIZED HEALTH CARE EDUCATION/TRAINING PROGRAM

## 2022-11-23 PROCEDURE — 86803 HEPATITIS C AB TEST: CPT | Performed by: STUDENT IN AN ORGANIZED HEALTH CARE EDUCATION/TRAINING PROGRAM

## 2022-11-23 PROCEDURE — 87591 N.GONORRHOEAE DNA AMP PROB: CPT | Performed by: STUDENT IN AN ORGANIZED HEALTH CARE EDUCATION/TRAINING PROGRAM

## 2022-11-23 PROCEDURE — 81025 URINE PREGNANCY TEST: CPT | Performed by: STUDENT IN AN ORGANIZED HEALTH CARE EDUCATION/TRAINING PROGRAM

## 2022-11-23 PROCEDURE — 80306 DRUG TEST PRSMV INSTRMNT: CPT | Performed by: STUDENT IN AN ORGANIZED HEALTH CARE EDUCATION/TRAINING PROGRAM

## 2022-11-23 PROCEDURE — 36415 COLL VENOUS BLD VENIPUNCTURE: CPT

## 2022-11-23 PROCEDURE — 87086 URINE CULTURE/COLONY COUNT: CPT | Performed by: STUDENT IN AN ORGANIZED HEALTH CARE EDUCATION/TRAINING PROGRAM

## 2022-11-23 PROCEDURE — 87661 TRICHOMONAS VAGINALIS AMPLIF: CPT | Performed by: STUDENT IN AN ORGANIZED HEALTH CARE EDUCATION/TRAINING PROGRAM

## 2022-11-23 NOTE — PROGRESS NOTES
I spent approximately 60 minutes with the patient acquiring the health and history intake, reviewing prior records, discussing topics related to healthy pregnancy, entering orders, and documentation. She is accompanied by her . She had a positive UPT in the office today.  Her LMP is 10/11/22. This is her 1st pregnancy.   The patient has hyperthyroidism. She sees Migel ALLAN and has an upcoming appointment. She had thyroid labs drawn on October 20th, and they were normal. She was told to stop her thyroid medicine when she had labs drawn the last time.   A newob bag is given today. The 1st trimester teaching was done with the patient. We discussed a healthy diet and exercise and what is recommended. The patient plans to buy prenatal gummy vitamins OTC. I also discussed Listeriosis and Toxoplasmosis and what fish to avoid due to high mercury levels. I informed patient not to be in hot tubs, saunas, or tanning beds. We discussed that spotting may occur after intercourse which is common, but if heavy bleeding like a period occurs to call [facility] hospital if clinic is closed.  I encouraged her to make an appointment with the dentist if she has not had a dental exam and cleaning in the last 6 months.  I instructed the patient that alcohol, illicit drug use, and tobacco smoking are not recommended in pregnancy.  She is considering breastfeeding. I talked to her about education classes. I told her that we are doing in-person classes now. I gave her a schedule of the upcoming classes. I encouraged the patient to get the TDAP vaccine in the 3rd trimester.  I discussed with the patient that a pediatrician needs to be chosen prior to delivery for the infant to have an appointment scheduled before leaving the hospital.  I discussed lab tests will be done today. Her last pap smear was in 2009.  All questions were answered at this time. She is scheduled for an ultrasound and to see Angelia ALLAN on  12/9/22.

## 2022-11-24 LAB
BACTERIA SPEC AEROBE CULT: NO GROWTH
C TRACH RRNA CVX QL NAA+PROBE: NEGATIVE
N GONORRHOEA RRNA SPEC QL NAA+PROBE: NEGATIVE
RUBV IGG SERPL IA-ACNC: 4.28 INDEX
TRICHOMONAS VAGINALIS PCR: NEGATIVE

## 2022-12-09 ENCOUNTER — INITIAL PRENATAL (OUTPATIENT)
Dept: OBSTETRICS AND GYNECOLOGY | Facility: CLINIC | Age: 32
End: 2022-12-09

## 2022-12-09 ENCOUNTER — LAB (OUTPATIENT)
Dept: LAB | Facility: HOSPITAL | Age: 32
End: 2022-12-09

## 2022-12-09 VITALS — WEIGHT: 162 LBS | DIASTOLIC BLOOD PRESSURE: 90 MMHG | SYSTOLIC BLOOD PRESSURE: 130 MMHG | BODY MASS INDEX: 26.15 KG/M2

## 2022-12-09 DIAGNOSIS — E05.90 HYPERTHYROIDISM AFFECTING PREGNANCY IN FIRST TRIMESTER: Primary | ICD-10-CM

## 2022-12-09 DIAGNOSIS — Z13.79 GENETIC SCREENING: ICD-10-CM

## 2022-12-09 DIAGNOSIS — Z3A.08 8 WEEKS GESTATION OF PREGNANCY: ICD-10-CM

## 2022-12-09 DIAGNOSIS — O99.281 HYPERTHYROIDISM AFFECTING PREGNANCY IN FIRST TRIMESTER: Primary | ICD-10-CM

## 2022-12-09 DIAGNOSIS — Z34.01 PRIMIGRAVIDA IN FIRST TRIMESTER: ICD-10-CM

## 2022-12-09 LAB — TSH SERPL DL<=0.05 MIU/L-ACNC: 0.87 UIU/ML (ref 0.27–4.2)

## 2022-12-09 PROCEDURE — 0501F PRENATAL FLOW SHEET: CPT | Performed by: NURSE PRACTITIONER

## 2022-12-09 PROCEDURE — 84443 ASSAY THYROID STIM HORMONE: CPT | Performed by: NURSE PRACTITIONER

## 2022-12-09 PROCEDURE — 36415 COLL VENOUS BLD VENIPUNCTURE: CPT | Performed by: NURSE PRACTITIONER

## 2022-12-09 RX ORDER — PNV NO.95/FERROUS FUM/FOLIC AC 28MG-0.8MG
1 TABLET ORAL DAILY
COMMUNITY

## 2022-12-09 NOTE — PROGRESS NOTES
[facility]  Obstetrics  Date of Service: 2022    CHIEF COMPLAINT:  New prenatal visit    HISTORY OF PRESENT ILLNESS:  Sophie Ashraf is a 31 y.o. y/o  at 9w0d by 1T US (Patient's last menstrual period was 10/11/2022 (within days).).  This was a planned pregnancy and the patient is supported by her , Jose.  Reports  nausea without vomiting.  Reports breast tenderness.  She denies any vaginal bleeding.  She has started taking a prenatal vitamin.    REVIEW OF SYSTEMS  Review of Systems   Constitutional: Negative for activity change, appetite change, diaphoresis, fatigue, unexpected weight gain and unexpected weight loss.   Respiratory: Negative for chest tightness and shortness of breath.    Cardiovascular: Negative for chest pain and palpitations.   Gastrointestinal: Positive for nausea. Negative for abdominal distention, abdominal pain, constipation, diarrhea and vomiting.   Genitourinary: Negative for amenorrhea, breast discharge, breast lump, breast pain, decreased libido, dyspareunia, dysuria, menstrual problem, pelvic pain, vaginal bleeding, vaginal discharge and vaginal pain.   Musculoskeletal: Negative for myalgias.   Skin: Negative for color change, dry skin and skin lesions.   Neurological: Negative for light-headedness and headache.   Psychiatric/Behavioral: Negative for agitation, dysphoric mood, sleep disturbance, depressed mood and stress. The patient is not nervous/anxious.        PRENATAL RISK FACTORS   Problems (from 22 to present)     Problem Noted Resolved    Hyperthyroidism affecting pregnancy in first trimester 2022 by Angelia Murcai APRN No    Overview Signed 2022 11:43 AM by Angelia Murcia APRN     - stopped methimazole 2.5mg in October.         Primigravida in first trimester 2022 by Angelia Murcia APRN No          DATING CRITERIA:  LMP (est 10/11/22) -- RENETTA 23  1TUS (22 at 9w0d) -- RENETTA  23    OBSTETRIC HISTORY:  OB History    Para Term  AB Living   1             SAB IAB Ectopic Molar Multiple Live Births                    # Outcome Date GA Lbr Wayne/2nd Weight Sex Delivery Anes PTL Lv   1 Current              GYN HISTORY:  Denies h/o sexually transmitted infections/pelvic inflammatory disease  Denies h/o abnormal pap smears  Last pap smear:   Last Completed Pap Smear     This patient has no relevant Health Maintenance data.        Denies h/o gynecologic surgeries, including biopsies of the cervix    PAST MEDICAL HISTORY:  Past Medical History:   Diagnosis Date   • Anxiety    • Thyroid disease      PAST SURGICAL HISTORY:  No past surgical history on file.  FAMILY HISTORY:  Family History   Problem Relation Age of Onset   • Thyroid disease Mother    • Kidney cancer Mother    • No Known Problems Father    • Thyroid disease Sister    • Hashimoto's thyroiditis Sister    • No Known Problems Brother    • No Known Problems Brother    • Breast cancer Maternal Grandmother    • Lung cancer Maternal Grandmother    • Leukemia Maternal Grandfather    • Diabetes Paternal Grandmother    • Lung cancer Paternal Grandfather      SOCIAL HISTORY:  Social History     Socioeconomic History   • Marital status: Single   Tobacco Use   • Smoking status: Never   • Smokeless tobacco: Never   Vaping Use   • Vaping Use: Former   Substance and Sexual Activity   • Alcohol use: Not Currently   • Drug use: Never   • Sexual activity: Yes     Partners: Male     Comment: patient has not had a pap smear since      GENETIC SCREENING:  Age >34 yo as of RENETTA: no  Thalassemia: no  NTD: no  CHD: no  Down Syndrome/MR/Fragile X/Autism: no  Ashkenazi Roman Catholic with Jose Alberto-Sachs, [place], familial dysautonomia: no  Sickle cell disease or trait: no  Hemophilia: no  Muscular dystrophy: no  Cystic fibrosis: no  Ringgold's chorea: no  Birth defects: no  Genetic/chromosomal disorders: no    INFECTION HISTORY:  TB exposure: no  HSV:  no  Illness since LMP: no  Prior GBS infected child: no  STIs: no    ALLERGIES:  No Known Allergies    MEDICATIONS:  Prior to Admission medications    Medication Sig Start Date End Date Taking? Authorizing Provider   Prenatal Vit-Fe Fumarate-FA (Prenatal Vitamin) 27-0.8 MG tablet Take 1 tablet by mouth Daily.   Yes Provider, MD Josr       PHYSICAL EXAM:   /90   Wt 73.5 kg (162 lb)   LMP 10/11/2022 (Within Days)   BMI 26.15 kg/m²   General: Alert, healthy, no distress, well nourished and well developed.  Neurologic: Alert, oriented to person, place, and time.  Gait normal.  Cranial nerves II-XII grossly intact.  HEENT: Normocephalic, atraumatic.  Extraocular muscles intact, pupils equal and reactive x2.    Teeth: Normal hygiene.  Neck: Supple, no adenopathy, thyroid normal size, non-tender, without nodularity, trachea midline.  Breasts: Deferred  Lungs: Normal respiratory effort.  Clear to auscultation bilaterally.  No wheezes, rhonci, or rales.  Heart: Regular rate and rhythm.  No murmer, rub or gallop.  Abdomen: Soft, non-tender, non-distended,no masses, no hepatosplenomegaly, no hernia.  Skin: No rash, no lesions.  Extremities: No cyanosis, clubbing or edema.  PELVIC EXAM:  Deferred    IMPRESSION:  Sophie Ashraf is a 31 y.o.  at 9w0d for a new prenatal visit.    PLAN:  1.  IUP at 9w0d  - Options counseling performed and patient desires continuation of pregnancy to term   - Prenatal labs ordered  - Genetic testing, including cystic fibrosis, was discussed and patient wants this drawn at 9 weeks  - Continue prenatal vitamins  - Weight gain counseling performed.   - Pregravid BMI 25-29.9: Recommend 15-25 lb  - Return to clinic in 4 weeks for return prenatal visit  - Reviewed COVID-19 visitation policy  - Reviewed COVID-19 precautions     Diagnosis Plan   1. Hyperthyroidism affecting pregnancy in first trimester  TSH      2. Primigravida in first trimester        3. 8 weeks gestation of  pregnancy        4. Genetic screening  Louis Panorama Prenatal Test Full Panel: Panorama Test Plus 5 Additional Microdeletions - Blood,        SANJANA Choi  12/9/2022  11:49 CST

## 2022-12-13 ENCOUNTER — LAB (OUTPATIENT)
Dept: LAB | Facility: HOSPITAL | Age: 32
End: 2022-12-13

## 2022-12-13 DIAGNOSIS — Z13.79 GENETIC SCREENING: ICD-10-CM

## 2022-12-14 DIAGNOSIS — Z13.79 GENETIC SCREENING: Primary | ICD-10-CM

## 2023-01-06 ENCOUNTER — ROUTINE PRENATAL (OUTPATIENT)
Dept: OBSTETRICS AND GYNECOLOGY | Facility: CLINIC | Age: 33
End: 2023-01-06
Payer: COMMERCIAL

## 2023-01-06 VITALS — WEIGHT: 163 LBS | BODY MASS INDEX: 26.31 KG/M2 | SYSTOLIC BLOOD PRESSURE: 108 MMHG | DIASTOLIC BLOOD PRESSURE: 70 MMHG

## 2023-01-06 DIAGNOSIS — Z34.02 PRIMIGRAVIDA IN SECOND TRIMESTER: ICD-10-CM

## 2023-01-06 DIAGNOSIS — O99.282 HYPERTHYROIDISM AFFECTING PREGNANCY IN SECOND TRIMESTER: ICD-10-CM

## 2023-01-06 DIAGNOSIS — E05.90 HYPERTHYROIDISM AFFECTING PREGNANCY IN SECOND TRIMESTER: ICD-10-CM

## 2023-01-06 DIAGNOSIS — Z36.89 ENCOUNTER FOR FETAL ANATOMIC SURVEY: ICD-10-CM

## 2023-01-06 DIAGNOSIS — Z3A.13 13 WEEKS GESTATION OF PREGNANCY: Primary | ICD-10-CM

## 2023-01-06 PROCEDURE — 0502F SUBSEQUENT PRENATAL CARE: CPT | Performed by: NURSE PRACTITIONER

## 2023-01-06 NOTE — PROGRESS NOTES
CC: Prenatal visit    Sophie Ashraf is a 32 y.o.  at 13w0d.  Doing well.  Denies N/V, dysuria, abnormal vaginal d/c, headaches, heartburn, constipation, cramping, regular contractions, LOF, or VB. Pregnancy complicated hy hx of hyperthyroidism. Most recent TSH normal.  Inquiring how often should her thyroid be checked; recommend at least every trimester but to follow-up with endocrinology. Has an appt on  with Migel Faust.     /70   Wt 73.9 kg (163 lb)   LMP 10/11/2022 (Within Days)   BMI 26.31 kg/m²   SVE: Deferred     Fetal Heart Rate: 158 us     Problems (from 22 to present)     Problem Noted Resolved    Primigravida in second trimester 2023 by Jeanette Stevens APRN No    Overview Signed 2023  1:50 PM by Jeanette Stevens APRN     Low risk NIPS female,  Normal Carrier screening  Needs postpartum Pap           Hyperthyroidism affecting pregnancy in second trimester 2022 by Angelia Murcia APRN No    Overview Addendum 2023  1:49 PM by Jeanette Stevens APRN     - stopped methimazole 2.5mg in October. TSH 0.874    Consider checking TSH each trimester or if symptomatic.                      A/P: Sophie Ashraf is a 32 y.o.  at 13w0d.  - Anatomy U/S in 6 weeks  - Discussed pap test screening (has not had a pap since ), desires screening postpartum.   - RTC in 6  weeks  - Reviewed COVID-19 visitation policy  - Reviewed COVID-19 precautions     Diagnosis Plan   1. 13 weeks gestation of pregnancy        2. Hyperthyroidism affecting pregnancy in second trimester  US Ob 14 + Weeks Single or First Gestation      3. Primigravida in second trimester  US Ob 14 + Weeks Single or First Gestation      4. Encounter for fetal anatomic survey  US Ob 14 + Weeks Single or First Gestation        SANJANA Damon  2023  13:50 CST

## 2023-02-01 LAB
Lab: NEGATIVE
Lab: NORMAL
NTRA ALPHA-THALASSEMIA: NEGATIVE
NTRA BETA-HEMOGLOBINOPATHIES: NEGATIVE
NTRA CANAVAN DISEASE: NEGATIVE
NTRA CYSTIC FIBROSIS: NEGATIVE
NTRA DUCHENNE/BECKER MUSCULAR DYSTROPHY: NEGATIVE
NTRA FAMILIAL DYSAUTONOMIA: NEGATIVE
NTRA FRAGILE X SYNDROME: NEGATIVE
NTRA GALACTOSEMIA: NEGATIVE
NTRA GAUCHER DISEASE: NEGATIVE
NTRA MEDIUM CHAIN ACYL-COA DEHYDROGENASE DEFICIENCY: NEGATIVE
NTRA POLYCYSTIC KIDNEY DISEASE, AUTOSOMAL RECESSIVE: NEGATIVE
NTRA SMITH-LEMLI-OPITZ SYNDROME: NEGATIVE
NTRA SPINAL MUSCULAR ATROPHY: NEGATIVE
NTRA TAY-SACHS DISEASE: NEGATIVE

## 2023-02-03 ENCOUNTER — LAB (OUTPATIENT)
Dept: LAB | Facility: HOSPITAL | Age: 33
End: 2023-02-03
Payer: COMMERCIAL

## 2023-02-03 LAB — TSH SERPL DL<=0.05 MIU/L-ACNC: 1.69 UIU/ML (ref 0.27–4.2)

## 2023-02-03 PROCEDURE — 84445 ASSAY OF TSI GLOBULIN: CPT | Performed by: INTERNAL MEDICINE

## 2023-02-03 PROCEDURE — 86376 MICROSOMAL ANTIBODY EACH: CPT | Performed by: INTERNAL MEDICINE

## 2023-02-03 PROCEDURE — 36415 COLL VENOUS BLD VENIPUNCTURE: CPT | Performed by: INTERNAL MEDICINE

## 2023-02-03 PROCEDURE — 84443 ASSAY THYROID STIM HORMONE: CPT | Performed by: INTERNAL MEDICINE

## 2023-02-03 PROCEDURE — 84436 ASSAY OF TOTAL THYROXINE: CPT | Performed by: INTERNAL MEDICINE

## 2023-02-04 LAB — T4 SERPL-MCNC: 11.9 MCG/DL (ref 4.5–11.7)

## 2023-02-05 LAB — THYROPEROXIDASE AB SERPL-ACNC: 29 IU/ML (ref 0–34)

## 2023-02-07 ENCOUNTER — OFFICE VISIT (OUTPATIENT)
Dept: ENDOCRINOLOGY | Facility: CLINIC | Age: 33
End: 2023-02-07
Payer: COMMERCIAL

## 2023-02-07 VITALS
SYSTOLIC BLOOD PRESSURE: 116 MMHG | BODY MASS INDEX: 27.13 KG/M2 | HEIGHT: 66 IN | OXYGEN SATURATION: 99 % | HEART RATE: 89 BPM | DIASTOLIC BLOOD PRESSURE: 76 MMHG | WEIGHT: 168.8 LBS

## 2023-02-07 DIAGNOSIS — O99.282 HYPERTHYROIDISM AFFECTING PREGNANCY IN SECOND TRIMESTER: Primary | ICD-10-CM

## 2023-02-07 DIAGNOSIS — E05.00 GRAVES DISEASE: ICD-10-CM

## 2023-02-07 DIAGNOSIS — E05.90 HYPERTHYROIDISM AFFECTING PREGNANCY IN SECOND TRIMESTER: Primary | ICD-10-CM

## 2023-02-07 LAB — TSI SER-ACNC: <0.1 IU/L (ref 0–0.55)

## 2023-02-07 PROCEDURE — 99213 OFFICE O/P EST LOW 20 MIN: CPT | Performed by: NURSE PRACTITIONER

## 2023-02-07 NOTE — PROGRESS NOTES
"Chief Complaint  Hyperthyroidism    Subjective          Sophie Ashraf presents to [facility] ENDOCRINOLOGY  History of Present Illness         In office visit    32 year old female presents for follow up     Reason hyperthyroidism due to Graves --in remission       Diagnosed in Dec. 2020    She is 18 weeks pregnant     Timing constant     Quality controlled    Severity moderate       Review of Systems - General ROS: negative           Objective   Vital Signs:   /76   Pulse 89   Ht 167.6 cm (66\")   Wt 76.6 kg (168 lb 12.8 oz)   SpO2 99%   BMI 27.25 kg/m²     Physical Exam  Constitutional:       Appearance: Normal appearance.   Cardiovascular:      Rate and Rhythm: Regular rhythm.      Heart sounds: Normal heart sounds.   Pulmonary:      Breath sounds: Normal breath sounds.   Musculoskeletal:      Cervical back: Normal range of motion.   Neurological:      General: No focal deficit present.      Mental Status: She is alert.   Psychiatric:         Mood and Affect: Mood normal.         Thought Content: Thought content normal.         Judgment: Judgment normal.        Result Review :   The following data was reviewed by: SANJANA Russo on 02/04/2022:  Common labs    Common Labs 8/4/22 8/4/22 10/20/22 10/20/22 11/23/22    1553 1553 1546 1546    Glucose  98  82    BUN  14  12    Creatinine  0.82  0.74    Sodium  139  138    Potassium  4.5  4.1    Chloride  101  102    Calcium  9.1  9.2    Albumin  4.70  4.70    Total Bilirubin  0.3  0.4    Alkaline Phosphatase  56  55    AST (SGOT)  18  17    ALT (SGPT)  12  12    WBC 7.65  9.20  9.08   Hemoglobin 12.2  12.9  12.8   Hematocrit 37.6  39.4  38.7   Platelets 250  256  303                       Assessment and Plan    Diagnoses and all orders for this visit:    1. Hyperthyroidism affecting pregnancy in second trimester (Primary)  -     TSH; Standing  -     T4; Standing  -     T3; Standing    2. Graves disease  -     " TSH; Standing  -     T4; Standing  -     T3; Standing           Hyperthyroidism due to Graves' disease     Positive Hashimoto's antibodies     She has stopped the methimazole due to pregnancy       Lab Results   Component Value Date    TSH 1.690 02/03/2023     Stay off medication   Labs monthly             due date  July 14                     Follow Up   Return in about 4 months (around 6/7/2023) for Recheck.  Patient was given instructions and counseling regarding her condition or for health maintenance advice. Please see specific information pulled into the AVS if appropriate.         This document has been electronically signed by SANJANA Russo on February 7, 2023 10:45 CST.

## 2023-02-16 ENCOUNTER — ROUTINE PRENATAL (OUTPATIENT)
Dept: OBSTETRICS AND GYNECOLOGY | Facility: CLINIC | Age: 33
End: 2023-02-16
Payer: COMMERCIAL

## 2023-02-16 VITALS — BODY MASS INDEX: 27.12 KG/M2 | WEIGHT: 168 LBS | DIASTOLIC BLOOD PRESSURE: 78 MMHG | SYSTOLIC BLOOD PRESSURE: 122 MMHG

## 2023-02-16 DIAGNOSIS — E05.90 HYPERTHYROIDISM AFFECTING PREGNANCY IN SECOND TRIMESTER: ICD-10-CM

## 2023-02-16 DIAGNOSIS — Z3A.18 18 WEEKS GESTATION OF PREGNANCY: ICD-10-CM

## 2023-02-16 DIAGNOSIS — O09.92 SUPERVISION OF HIGH RISK PREGNANCY IN SECOND TRIMESTER: Primary | ICD-10-CM

## 2023-02-16 DIAGNOSIS — O99.282 HYPERTHYROIDISM AFFECTING PREGNANCY IN SECOND TRIMESTER: ICD-10-CM

## 2023-02-16 PROBLEM — R53.83 OTHER FATIGUE: Status: RESOLVED | Noted: 2021-02-24 | Resolved: 2023-02-16

## 2023-02-16 PROBLEM — R00.2 PALPITATION: Status: RESOLVED | Noted: 2021-02-24 | Resolved: 2023-02-16

## 2023-02-16 PROBLEM — R94.6 ABNORMAL THYROID FUNCTION TEST: Status: RESOLVED | Noted: 2021-02-24 | Resolved: 2023-02-16

## 2023-02-16 PROCEDURE — 0502F SUBSEQUENT PRENATAL CARE: CPT | Performed by: OBSTETRICS & GYNECOLOGY

## 2023-02-16 NOTE — PROGRESS NOTES
CC: Prenatal visit    Sophie Ashraf is a 32 y.o.  at 18w6d.  Doing well.  Denies contractions, LOF, or VB.     /78   Wt 76.2 kg (168 lb)   LMP 10/11/2022 (Within Days)   BMI 27.12 kg/m²   Fetal Heart Rate: 149     Prelim US- EFW 268g w/ AC 52%ile, JENNIE 13.8 cm, cephalic, placenta anterior w/o previa, anatomy WNL, GIRL, CL 3.75 cm, R and L ovary WNL     Problems (from 22 to present)     Problem Noted Resolved    Supervision of high risk pregnancy in second trimester 2023 by Jeanette Stevens APRN No    Overview Signed 2023  1:50 PM by Jeanette Stevens APRN     Low risk NIPS female,  Normal Carrier screening  Needs postpartum Pap           Hyperthyroidism affecting pregnancy in second trimester 2022 by Angelia Murcia APRN No    Overview Addendum 2023  2:59 PM by Garima Ross MD     Graves disease  - stopped methimazole 2.5mg in October. TSH 0.874  2/3- TSH 1.69  Recheck TSH in 3T                 A/P: Sophie Ashraf is a 32 y.o.  at 18w6d.  - RTC in 4 weeks  - RTC in 8 weeks w/ 3T labs, Tdap, breastpump script if desired  - Patient states that Migel Faust would like for her to check her TSH every 4 weeks, due beginning of March  - Reviewed COVID-19 visitation policy  - Reviewed COVID-19 precautions     Diagnosis Plan   1. Supervision of high risk pregnancy in second trimester  CBC (No Diff)    Glucose, Post 50 Gm Glucola      2. Hyperthyroidism affecting pregnancy in second trimester        3. 18 weeks gestation of pregnancy          Garima Ross MD  2023  16:04 CST

## 2023-03-08 ENCOUNTER — LAB (OUTPATIENT)
Dept: LAB | Facility: HOSPITAL | Age: 33
End: 2023-03-08
Payer: COMMERCIAL

## 2023-03-08 DIAGNOSIS — E05.00 GRAVES DISEASE: ICD-10-CM

## 2023-03-08 DIAGNOSIS — O99.282 HYPERTHYROIDISM AFFECTING PREGNANCY IN SECOND TRIMESTER: ICD-10-CM

## 2023-03-08 DIAGNOSIS — E05.90 HYPERTHYROIDISM AFFECTING PREGNANCY IN SECOND TRIMESTER: ICD-10-CM

## 2023-03-08 PROCEDURE — 84436 ASSAY OF TOTAL THYROXINE: CPT

## 2023-03-08 PROCEDURE — 84480 ASSAY TRIIODOTHYRONINE (T3): CPT

## 2023-03-08 PROCEDURE — 84443 ASSAY THYROID STIM HORMONE: CPT

## 2023-03-08 PROCEDURE — 36415 COLL VENOUS BLD VENIPUNCTURE: CPT

## 2023-03-09 ENCOUNTER — TELEPHONE (OUTPATIENT)
Dept: ENDOCRINOLOGY | Facility: CLINIC | Age: 33
End: 2023-03-09
Payer: COMMERCIAL

## 2023-03-09 LAB
T3 SERPL-MCNC: 152 NG/DL (ref 80–200)
T4 SERPL-MCNC: 9.59 MCG/DL (ref 4.5–11.7)
TSH SERPL DL<=0.05 MIU/L-ACNC: 0.73 UIU/ML (ref 0.27–4.2)

## 2023-03-16 ENCOUNTER — ROUTINE PRENATAL (OUTPATIENT)
Dept: OBSTETRICS AND GYNECOLOGY | Facility: CLINIC | Age: 33
End: 2023-03-16
Payer: COMMERCIAL

## 2023-03-16 VITALS — SYSTOLIC BLOOD PRESSURE: 106 MMHG | BODY MASS INDEX: 28.73 KG/M2 | WEIGHT: 178 LBS | DIASTOLIC BLOOD PRESSURE: 70 MMHG

## 2023-03-16 DIAGNOSIS — E05.90 HYPERTHYROIDISM AFFECTING PREGNANCY IN SECOND TRIMESTER: Primary | ICD-10-CM

## 2023-03-16 DIAGNOSIS — O99.282 HYPERTHYROIDISM AFFECTING PREGNANCY IN SECOND TRIMESTER: Primary | ICD-10-CM

## 2023-03-16 DIAGNOSIS — O09.92 SUPERVISION OF HIGH RISK PREGNANCY IN SECOND TRIMESTER: ICD-10-CM

## 2023-03-16 DIAGNOSIS — Z36.89 ENCOUNTER FOR OTHER SPECIFIED ANTENATAL SCREENING: ICD-10-CM

## 2023-03-16 DIAGNOSIS — Z3A.22 22 WEEKS GESTATION OF PREGNANCY: ICD-10-CM

## 2023-03-16 NOTE — PROGRESS NOTES
CC: Prenatal visit    Sophie Ashraf is a 32 y.o.  at 22w6d.  Doing well.  Denies N/V, dysuria, abnormal vaginal d/c, headaches, heartburn, constipation, cramping, regular contractions, LOF, or VB.  Reports good FM.    /70   Wt 80.7 kg (178 lb)   LMP 10/11/2022 (Within Days)   BMI 28.73 kg/m²   SVE: NA  Fundal Height (cm): 24 cm  Fetal Heart Rate: 148     Problems (from 22 to present)     Problem Noted Resolved    Supervision of high risk pregnancy in second trimester 2023 by Jeanette Stevens APRN No    Overview Signed 2023  1:50 PM by Jeanette Stevens APRN     Low risk NIPS female,  Normal Carrier screening  Needs postpartum Pap           Hyperthyroidism affecting pregnancy in second trimester 2022 by Angelia Murcia APRN No    Overview Addendum 2023  4:05 PM by Garima Ross MD     Graves disease  - stopped methimazole 2.5mg in October. TSH 0.874  2/3- TSH 1.69  Followed by Migel Faust: TSH q4 wks               A/P: Sophie Ashraf is a 32 y.o.  at 22w6d.  - RTC in 4 weeks  - 3T labs next visit; most recent TSH 3/8: WNL  - Reviewed COVID-19 visitation policy  - Reviewed COVID-19 precautions     Diagnosis Plan   1. Hyperthyroidism affecting pregnancy in second trimester        2. Supervision of high risk pregnancy in second trimester        3. 22 weeks gestation of pregnancy        4. Encounter for other specified  screening  CBC (No Diff)    Glucose, Post 50 Gm Glucola        SANJANA Choi  3/31/2023  14:21 CDT

## 2023-04-10 ENCOUNTER — LAB (OUTPATIENT)
Dept: LAB | Facility: HOSPITAL | Age: 33
End: 2023-04-10
Payer: COMMERCIAL

## 2023-04-10 DIAGNOSIS — E05.90 HYPERTHYROIDISM AFFECTING PREGNANCY IN SECOND TRIMESTER: ICD-10-CM

## 2023-04-10 DIAGNOSIS — O99.282 HYPERTHYROIDISM AFFECTING PREGNANCY IN SECOND TRIMESTER: ICD-10-CM

## 2023-04-10 DIAGNOSIS — E05.00 GRAVES DISEASE: ICD-10-CM

## 2023-04-10 PROCEDURE — 36415 COLL VENOUS BLD VENIPUNCTURE: CPT

## 2023-04-10 PROCEDURE — 84443 ASSAY THYROID STIM HORMONE: CPT

## 2023-04-10 PROCEDURE — 84436 ASSAY OF TOTAL THYROXINE: CPT

## 2023-04-10 PROCEDURE — 84480 ASSAY TRIIODOTHYRONINE (T3): CPT

## 2023-04-11 ENCOUNTER — TELEPHONE (OUTPATIENT)
Dept: ENDOCRINOLOGY | Facility: CLINIC | Age: 33
End: 2023-04-11
Payer: COMMERCIAL

## 2023-04-11 ENCOUNTER — LAB (OUTPATIENT)
Dept: LAB | Facility: HOSPITAL | Age: 33
End: 2023-04-11
Payer: COMMERCIAL

## 2023-04-11 ENCOUNTER — ROUTINE PRENATAL (OUTPATIENT)
Dept: OBSTETRICS AND GYNECOLOGY | Facility: CLINIC | Age: 33
End: 2023-04-11
Payer: COMMERCIAL

## 2023-04-11 VITALS — DIASTOLIC BLOOD PRESSURE: 70 MMHG | WEIGHT: 182.6 LBS | BODY MASS INDEX: 29.47 KG/M2 | SYSTOLIC BLOOD PRESSURE: 116 MMHG

## 2023-04-11 DIAGNOSIS — O99.282 HYPERTHYROIDISM AFFECTING PREGNANCY IN SECOND TRIMESTER: ICD-10-CM

## 2023-04-11 DIAGNOSIS — Z36.89 ENCOUNTER FOR OTHER SPECIFIED ANTENATAL SCREENING: ICD-10-CM

## 2023-04-11 DIAGNOSIS — Z23 NEED FOR TDAP VACCINATION: ICD-10-CM

## 2023-04-11 DIAGNOSIS — O09.92 SUPERVISION OF HIGH RISK PREGNANCY IN SECOND TRIMESTER: Primary | ICD-10-CM

## 2023-04-11 DIAGNOSIS — E05.90 HYPERTHYROIDISM AFFECTING PREGNANCY IN SECOND TRIMESTER: ICD-10-CM

## 2023-04-11 DIAGNOSIS — Z3A.26 26 WEEKS GESTATION OF PREGNANCY: ICD-10-CM

## 2023-04-11 LAB
GLUCOSE 1H P 100 G GLC PO SERPL-MCNC: 96 MG/DL (ref 65–139)
T3 SERPL-MCNC: 177 NG/DL (ref 80–200)
T4 SERPL-MCNC: 10.7 MCG/DL (ref 4.5–11.7)
TSH SERPL DL<=0.05 MIU/L-ACNC: 0.48 UIU/ML (ref 0.27–4.2)

## 2023-04-11 PROCEDURE — 82950 GLUCOSE TEST: CPT

## 2023-04-11 PROCEDURE — 85027 COMPLETE CBC AUTOMATED: CPT

## 2023-04-11 PROCEDURE — 36415 COLL VENOUS BLD VENIPUNCTURE: CPT

## 2023-04-12 LAB
DEPRECATED RDW RBC AUTO: 39.4 FL (ref 37–54)
ERYTHROCYTE [DISTWIDTH] IN BLOOD BY AUTOMATED COUNT: 13.1 % (ref 12.3–15.4)
HCT VFR BLD AUTO: 31.7 % (ref 34–46.6)
HGB BLD-MCNC: 10.9 G/DL (ref 12–15.9)
MCH RBC QN AUTO: 28.3 PG (ref 26.6–33)
MCHC RBC AUTO-ENTMCNC: 34.4 G/DL (ref 31.5–35.7)
MCV RBC AUTO: 82.3 FL (ref 79–97)
PLATELET # BLD AUTO: 207 10*3/MM3 (ref 140–450)
PMV BLD AUTO: 12.7 FL (ref 6–12)
RBC # BLD AUTO: 3.85 10*6/MM3 (ref 3.77–5.28)
WBC NRBC COR # BLD: 12.95 10*3/MM3 (ref 3.4–10.8)

## 2023-04-13 NOTE — PROGRESS NOTES
CC: Prenatal visit    Sophie Ashraf is a 32 y.o.  at 26w4d.  Doing well.  Denies contractions, LOF, or VB.  Reports good FM.    /70   Wt 82.8 kg (182 lb 9.6 oz)   LMP 10/11/2022 (Within Days)   BMI 29.47 kg/m²   Fundal Height (cm): 26 cm  Fetal Heart Rate: 141     Problems (from 22 to present)     Problem Noted Resolved    Supervision of high risk pregnancy in second trimester 2023 by Jeanette Stevens APRN No    Overview Signed 2023  1:50 PM by Jeanette Stevens APRN     Low risk NIPS female,  Normal Carrier screening  Needs postpartum Pap           Hyperthyroidism affecting pregnancy in second trimester 2022 by Angelia Murcia APRN No    Overview Addendum 2023  4:05 PM by Garima Ross MD     Graves disease  - stopped methimazole 2.5mg in October. TSH 0.874  2/3- TSH 1.69  Followed by Migel Faust: TSH q4 wks             A/P: Sophie Ashraf is a 32 y.o.  at 26w4d.  - RTC in 2 weeks w/ GS (hyperthyroidism)  - 3T labs today  - Tdap today  - Breastpump script sent today     Diagnosis Plan   1. Supervision of high risk pregnancy in second trimester        2. Hyperthyroidism affecting pregnancy in second trimester  US ob follow up transabdominal approach      3. Need for Tdap vaccination  Tdap Vaccine Greater Than or Equal To 8yo IM      4. 26 weeks gestation of pregnancy          Garima Ross MD  2023  19:31 CDT

## 2023-04-24 DIAGNOSIS — R00.2 HEART PALPITATIONS: Primary | ICD-10-CM

## 2023-04-28 ENCOUNTER — ROUTINE PRENATAL (OUTPATIENT)
Dept: OBSTETRICS AND GYNECOLOGY | Facility: CLINIC | Age: 33
End: 2023-04-28
Payer: COMMERCIAL

## 2023-04-28 VITALS — BODY MASS INDEX: 30.21 KG/M2 | DIASTOLIC BLOOD PRESSURE: 68 MMHG | SYSTOLIC BLOOD PRESSURE: 104 MMHG | WEIGHT: 187.2 LBS

## 2023-04-28 DIAGNOSIS — E05.90 HYPERTHYROIDISM AFFECTING PREGNANCY IN THIRD TRIMESTER: Primary | ICD-10-CM

## 2023-04-28 DIAGNOSIS — Z3A.29 29 WEEKS GESTATION OF PREGNANCY: ICD-10-CM

## 2023-04-28 DIAGNOSIS — O09.93 SUPERVISION OF HIGH RISK PREGNANCY IN THIRD TRIMESTER: ICD-10-CM

## 2023-04-28 DIAGNOSIS — O99.283 HYPERTHYROIDISM AFFECTING PREGNANCY IN THIRD TRIMESTER: Primary | ICD-10-CM

## 2023-04-28 PROCEDURE — 0502F SUBSEQUENT PRENATAL CARE: CPT | Performed by: NURSE PRACTITIONER

## 2023-04-28 NOTE — PROGRESS NOTES
CC: Prenatal visit    Sophie Ashraf is a 32 y.o.  at 29w0d.  Doing well.  Denies N/V, dysuria, abnormal vaginal d/c, headaches, heartburn, constipation, cramping, regular contractions, LOF, or VB.  Reports good FM.    /68   Wt 84.9 kg (187 lb 3.2 oz)   LMP 10/11/2022 (Within Days)   BMI 30.21 kg/m²   SVE: NA    GS preliminary report reviewed. EFW- 1484g, 73%tile AC- 69%tile. JENNIE- 11.82cm.       Component      Latest Ref Rng 4/10/2023 2023   WBC      3.40 - 10.80 10*3/mm3  12.95 (H)    RBC      3.77 - 5.28 10*6/mm3  3.85    Hemoglobin      12.0 - 15.9 g/dL  10.9 (L)    Hematocrit      34.0 - 46.6 %  31.7 (L)    MCV      79.0 - 97.0 fL  82.3    MCH      26.6 - 33.0 pg  28.3    MCHC      31.5 - 35.7 g/dL  34.4    RDW      12.3 - 15.4 %  13.1    RDW-SD      37.0 - 54.0 fl  39.4    MPV      6.0 - 12.0 fL  12.7 (H)    Platelets      140 - 450 10*3/mm3  207    TSH Baseline      0.270 - 4.200 uIU/mL 0.477     T3, Total      80.0 - 200.0 ng/dl 177.0     Gestational GCT      65 - 139 mg/dL  96       (H) High  (L) Low       Fetal Heart Rate: 149     Problems (from 22 to present)     Problem Noted Resolved    Supervision of high risk pregnancy in third trimester 2023 by Jeanette Stevens APRN No    Overview Signed 2023  1:50 PM by Jeanette Stevens APRN     Low risk NIPS female,  Normal Carrier screening  Needs postpartum Pap           Hyperthyroidism affecting pregnancy in third trimester 2022 by Angelia Murcia APRN No    Overview Addendum 2023  4:05 PM by Garima Ross MD     Graves disease  - stopped methimazole 2.5mg in October. TSH 0.874  2/3- TSH 1.69  Followed by Migel Faust: TSH q4 wks               A/P: Sophie Ashraf is a 32 y.o.  at 29w0d.  - RTC in 2 weeks for BENITO visit  - GS in 4 weeks   - Reviewed COVID-19 visitation policy  - Reviewed COVID-19 precautions     Diagnosis Plan   1. Hyperthyroidism affecting pregnancy in  third trimester  No meds at this time. TSH normal.      2. Supervision of high risk pregnancy in third trimester        3. 29 weeks gestation of pregnancy          Angelia Murcia, SANJANA  4/28/2023  11:09 CDT

## 2023-05-12 ENCOUNTER — ROUTINE PRENATAL (OUTPATIENT)
Dept: OBSTETRICS AND GYNECOLOGY | Facility: CLINIC | Age: 33
End: 2023-05-12
Payer: COMMERCIAL

## 2023-05-12 ENCOUNTER — LAB (OUTPATIENT)
Dept: LAB | Facility: HOSPITAL | Age: 33
End: 2023-05-12
Payer: COMMERCIAL

## 2023-05-12 VITALS — DIASTOLIC BLOOD PRESSURE: 70 MMHG | SYSTOLIC BLOOD PRESSURE: 108 MMHG | WEIGHT: 188 LBS | BODY MASS INDEX: 30.34 KG/M2

## 2023-05-12 DIAGNOSIS — E05.90 HYPERTHYROIDISM AFFECTING PREGNANCY IN THIRD TRIMESTER: ICD-10-CM

## 2023-05-12 DIAGNOSIS — E05.90 HYPERTHYROIDISM AFFECTING PREGNANCY IN SECOND TRIMESTER: ICD-10-CM

## 2023-05-12 DIAGNOSIS — Z3A.31 31 WEEKS GESTATION OF PREGNANCY: ICD-10-CM

## 2023-05-12 DIAGNOSIS — O09.93 SUPERVISION OF HIGH RISK PREGNANCY IN THIRD TRIMESTER: Primary | ICD-10-CM

## 2023-05-12 DIAGNOSIS — E05.00 GRAVES DISEASE: ICD-10-CM

## 2023-05-12 DIAGNOSIS — O99.282 HYPERTHYROIDISM AFFECTING PREGNANCY IN SECOND TRIMESTER: ICD-10-CM

## 2023-05-12 DIAGNOSIS — O99.283 HYPERTHYROIDISM AFFECTING PREGNANCY IN THIRD TRIMESTER: ICD-10-CM

## 2023-05-12 LAB
T3 SERPL-MCNC: 194 NG/DL (ref 80–200)
T4 SERPL-MCNC: 9.43 MCG/DL (ref 4.5–11.7)
TSH SERPL DL<=0.05 MIU/L-ACNC: 0.8 UIU/ML (ref 0.27–4.2)

## 2023-05-12 PROCEDURE — 84436 ASSAY OF TOTAL THYROXINE: CPT

## 2023-05-12 PROCEDURE — 84480 ASSAY TRIIODOTHYRONINE (T3): CPT

## 2023-05-12 PROCEDURE — 36415 COLL VENOUS BLD VENIPUNCTURE: CPT

## 2023-05-12 PROCEDURE — 84443 ASSAY THYROID STIM HORMONE: CPT

## 2023-05-12 NOTE — PROGRESS NOTES
CC: Prenatal visit    Sophie Ashraf is a 32 y.o.  at 31w0d.  Doing well.  Denies contractions, LOF, or VB.  Reports good FM.    /70   Wt 85.3 kg (188 lb)   LMP 10/11/2022 (Within Days)   BMI 30.34 kg/m²   Fundal Height (cm): 31 cm  Fetal Heart Rate: 157     Problems (from 22 to present)     Problem Noted Resolved    Supervision of high risk pregnancy in third trimester 2023 by Jeanette Stevens APRN No    Overview Signed 2023  1:50 PM by Jeanette Stevens APRN     Low risk NIPS female,  Normal Carrier screening  Needs postpartum Pap           Hyperthyroidism affecting pregnancy in third trimester 2022 by Angelia Murcia APRN No    Overview Addendum 2023  4:05 PM by Garima Ross MD     Graves disease  - stopped methimazole 2.5mg in October. TSH 0.874  2/3- TSH 1.69  Followed by Migel Faust: TSH q4 wks             A/P: Sophie Ashraf is a 32 y.o.  at 31w0d.  - RTC in 2 weeks w/ GS (hyperthyroidism)  - RTC in 4 weeks w/ GBS     Diagnosis Plan   1. Supervision of high risk pregnancy in third trimester        2. Hyperthyroidism affecting pregnancy in third trimester        3. 31 weeks gestation of pregnancy          Garima Ross MD  2023  10:38 CDT

## 2023-05-26 ENCOUNTER — ROUTINE PRENATAL (OUTPATIENT)
Dept: OBSTETRICS AND GYNECOLOGY | Facility: CLINIC | Age: 33
End: 2023-05-26
Payer: COMMERCIAL

## 2023-05-26 VITALS — BODY MASS INDEX: 31.31 KG/M2 | WEIGHT: 194 LBS | DIASTOLIC BLOOD PRESSURE: 70 MMHG | SYSTOLIC BLOOD PRESSURE: 132 MMHG

## 2023-05-26 DIAGNOSIS — Z3A.33 33 WEEKS GESTATION OF PREGNANCY: ICD-10-CM

## 2023-05-26 DIAGNOSIS — E05.90 HYPERTHYROIDISM AFFECTING PREGNANCY IN THIRD TRIMESTER: Primary | ICD-10-CM

## 2023-05-26 DIAGNOSIS — O09.93 SUPERVISION OF HIGH RISK PREGNANCY IN THIRD TRIMESTER: ICD-10-CM

## 2023-05-26 DIAGNOSIS — O99.283 HYPERTHYROIDISM AFFECTING PREGNANCY IN THIRD TRIMESTER: Primary | ICD-10-CM

## 2023-06-09 ENCOUNTER — ROUTINE PRENATAL (OUTPATIENT)
Dept: OBSTETRICS AND GYNECOLOGY | Facility: CLINIC | Age: 33
End: 2023-06-09
Payer: COMMERCIAL

## 2023-06-09 ENCOUNTER — LAB (OUTPATIENT)
Dept: LAB | Facility: HOSPITAL | Age: 33
End: 2023-06-09
Payer: COMMERCIAL

## 2023-06-09 VITALS — BODY MASS INDEX: 31.7 KG/M2 | WEIGHT: 196.4 LBS | DIASTOLIC BLOOD PRESSURE: 74 MMHG | SYSTOLIC BLOOD PRESSURE: 126 MMHG

## 2023-06-09 DIAGNOSIS — E05.00 GRAVES DISEASE: ICD-10-CM

## 2023-06-09 DIAGNOSIS — Z3A.35 35 WEEKS GESTATION OF PREGNANCY: ICD-10-CM

## 2023-06-09 DIAGNOSIS — E05.90 HYPERTHYROIDISM AFFECTING PREGNANCY IN THIRD TRIMESTER: ICD-10-CM

## 2023-06-09 DIAGNOSIS — O99.283 HYPERTHYROIDISM AFFECTING PREGNANCY IN THIRD TRIMESTER: ICD-10-CM

## 2023-06-09 DIAGNOSIS — O99.282 HYPERTHYROIDISM AFFECTING PREGNANCY IN SECOND TRIMESTER: ICD-10-CM

## 2023-06-09 DIAGNOSIS — O09.93 SUPERVISION OF HIGH RISK PREGNANCY IN THIRD TRIMESTER: Primary | ICD-10-CM

## 2023-06-09 DIAGNOSIS — E05.90 HYPERTHYROIDISM AFFECTING PREGNANCY IN SECOND TRIMESTER: ICD-10-CM

## 2023-06-09 LAB
T3 SERPL-MCNC: 177 NG/DL (ref 80–200)
T4 SERPL-MCNC: 10.1 MCG/DL (ref 4.5–11.7)
TSH SERPL DL<=0.05 MIU/L-ACNC: 1.24 UIU/ML (ref 0.27–4.2)

## 2023-06-09 PROCEDURE — 84480 ASSAY TRIIODOTHYRONINE (T3): CPT

## 2023-06-09 PROCEDURE — 84436 ASSAY OF TOTAL THYROXINE: CPT

## 2023-06-09 PROCEDURE — 0502F SUBSEQUENT PRENATAL CARE: CPT | Performed by: OBSTETRICS & GYNECOLOGY

## 2023-06-09 PROCEDURE — 84443 ASSAY THYROID STIM HORMONE: CPT

## 2023-06-09 PROCEDURE — 36415 COLL VENOUS BLD VENIPUNCTURE: CPT

## 2023-06-09 NOTE — PROGRESS NOTES
CC: Prenatal visit    Sophie Ashraf is a 32 y.o.  at 35w0d.  Doing well.  Denies contractions, LOF, or VB.  Reports good FM.    /74   Wt 89.1 kg (196 lb 6.4 oz)   LMP 10/11/2022 (Within Days)   BMI 31.70 kg/m²   Fundal Height (cm): 36 cm  Fetal Heart Rate: 136     Problems (from 22 to present)     Problem Noted Resolved    Supervision of high risk pregnancy in third trimester 2023 by Jeanette Stevens APRN No    Overview Signed 2023  1:50 PM by Jeanette Stevens APRN     Low risk NIPS female,  Normal Carrier screening  Needs postpartum Pap           Hyperthyroidism affecting pregnancy in third trimester 2022 by Angelia Murcia APRN No    Overview Addendum 2023  4:05 PM by Garima Ross MD     Graves disease  - stopped methimazole 2.5mg in October. TSH 0.874  2/3- TSH 1.69  Followed by Migel Faust: TSH q4 wks             A/P: Sophie Ashraf is a 32 y.o.  at 35w0d.  - RTC in 1 week w/ GBS, BSUS   - Discussed EIOL at 39 wks vs spontaneous labor vs IOL at 41 wks due to late term gestation.  They will consider.     Diagnosis Plan   1. Supervision of high risk pregnancy in third trimester        2. Hyperthyroidism affecting pregnancy in third trimester        3. 35 weeks gestation of pregnancy          Garima Ross MD  2023  18:12 CDT

## 2023-06-16 PROBLEM — K21.9 GASTROESOPHAGEAL REFLUX IN PREGNANCY IN THIRD TRIMESTER: Status: ACTIVE | Noted: 2023-06-16

## 2023-06-16 PROBLEM — O99.613 GASTROESOPHAGEAL REFLUX IN PREGNANCY IN THIRD TRIMESTER: Status: ACTIVE | Noted: 2023-06-16

## 2023-06-16 PROCEDURE — 87653 STREP B DNA AMP PROBE: CPT | Performed by: NURSE PRACTITIONER

## 2023-07-04 PROBLEM — Z12.4 PAP SMEAR FOR CERVICAL CANCER SCREENING: Status: ACTIVE | Noted: 2023-07-04

## 2023-07-04 PROBLEM — O13.3 GESTATIONAL HYPERTENSION, THIRD TRIMESTER: Status: ACTIVE | Noted: 2023-07-04

## 2023-07-05 PROBLEM — Z98.891 STATUS POST CESAREAN DELIVERY: Status: ACTIVE | Noted: 2023-07-05

## 2023-07-05 PROBLEM — O61.9 FAILED INDUCTION OF LABOR: Status: ACTIVE | Noted: 2023-07-04

## 2023-07-05 PROBLEM — O14.13 SEVERE PRE-ECLAMPSIA, THIRD TRIMESTER: Status: ACTIVE | Noted: 2023-07-04

## 2023-07-08 ENCOUNTER — HOSPITAL ENCOUNTER (OUTPATIENT)
Facility: HOSPITAL | Age: 33
Setting detail: OBSERVATION
Discharge: HOME | End: 2023-07-11
Attending: FAMILY MEDICINE | Admitting: OBSTETRICS & GYNECOLOGY
Payer: COMMERCIAL

## 2023-07-08 DIAGNOSIS — O13.9 PREGNANCY INDUCED HYPERTENSION, ANTEPARTUM: Primary | ICD-10-CM

## 2023-07-08 DIAGNOSIS — Z98.891 STATUS POST CESAREAN DELIVERY: ICD-10-CM

## 2023-07-08 PROCEDURE — 80053 COMPREHEN METABOLIC PANEL: CPT | Performed by: FAMILY MEDICINE

## 2023-07-08 PROCEDURE — 83615 LACTATE (LD) (LDH) ENZYME: CPT | Performed by: FAMILY MEDICINE

## 2023-07-08 PROCEDURE — 85610 PROTHROMBIN TIME: CPT | Performed by: FAMILY MEDICINE

## 2023-07-08 PROCEDURE — 84550 ASSAY OF BLOOD/URIC ACID: CPT | Performed by: FAMILY MEDICINE

## 2023-07-08 PROCEDURE — 85007 BL SMEAR W/DIFF WBC COUNT: CPT | Performed by: FAMILY MEDICINE

## 2023-07-08 PROCEDURE — 85384 FIBRINOGEN ACTIVITY: CPT | Performed by: FAMILY MEDICINE

## 2023-07-08 PROCEDURE — 85730 THROMBOPLASTIN TIME PARTIAL: CPT | Performed by: FAMILY MEDICINE

## 2023-07-08 PROCEDURE — 85025 COMPLETE CBC W/AUTO DIFF WBC: CPT | Performed by: FAMILY MEDICINE

## 2023-07-08 PROCEDURE — 81001 URINALYSIS AUTO W/SCOPE: CPT | Performed by: FAMILY MEDICINE

## 2023-07-08 PROCEDURE — 99284 EMERGENCY DEPT VISIT MOD MDM: CPT

## 2023-07-08 RX ORDER — LABETALOL HYDROCHLORIDE 5 MG/ML
20 INJECTION, SOLUTION INTRAVENOUS ONCE
Status: DISCONTINUED | OUTPATIENT
Start: 2023-07-09 | End: 2023-07-11 | Stop reason: HOSPADM

## 2023-07-08 NOTE — Clinical Note
Level of Care: Observation Unit [28]   Diagnosis: Postpartum hypertension [711707]   Admitting Physician: DOMINGO MATTHEW [[id number]   Attending Physician: DOMINGO MATTHEW [[id number]]

## 2023-07-09 ENCOUNTER — APPOINTMENT (OUTPATIENT)
Dept: ULTRASOUND IMAGING | Facility: HOSPITAL | Age: 33
End: 2023-07-09
Payer: COMMERCIAL

## 2023-07-09 LAB
ABO GROUP BLD: NORMAL
ALBUMIN SERPL-MCNC: 2.8 G/DL (ref 3.5–5.2)
ALBUMIN SERPL-MCNC: 2.9 G/DL (ref 3.5–5.2)
ALBUMIN SERPL-MCNC: 3.3 G/DL (ref 3.5–5.2)
ALBUMIN/GLOB SERPL: 0.9 G/DL
ALBUMIN/GLOB SERPL: 1 G/DL
ALBUMIN/GLOB SERPL: 1.1 G/DL
ALP SERPL-CCNC: 107 U/L (ref 39–117)
ALP SERPL-CCNC: 107 U/L (ref 39–117)
ALP SERPL-CCNC: 135 U/L (ref 39–117)
ALT SERPL W P-5'-P-CCNC: 86 U/L (ref 1–33)
ALT SERPL W P-5'-P-CCNC: 90 U/L (ref 1–33)
ALT SERPL W P-5'-P-CCNC: 90 U/L (ref 1–33)
ANION GAP SERPL CALCULATED.3IONS-SCNC: 10 MMOL/L (ref 5–15)
ANION GAP SERPL CALCULATED.3IONS-SCNC: 12 MMOL/L (ref 5–15)
ANION GAP SERPL CALCULATED.3IONS-SCNC: 15 MMOL/L (ref 5–15)
ANISOCYTOSIS BLD QL: ABNORMAL
APTT PPP: 29.8 SECONDS (ref 20–40.3)
AST SERPL-CCNC: 117 U/L (ref 1–32)
AST SERPL-CCNC: 145 U/L (ref 1–32)
AST SERPL-CCNC: 152 U/L (ref 1–32)
BACTERIA UR QL AUTO: ABNORMAL /HPF
BASOPHILS # BLD AUTO: 0.03 10*3/MM3 (ref 0–0.2)
BASOPHILS NFR BLD AUTO: 0.3 % (ref 0–1.5)
BILIRUB SERPL-MCNC: 0.2 MG/DL (ref 0–1.2)
BILIRUB SERPL-MCNC: 0.3 MG/DL (ref 0–1.2)
BILIRUB SERPL-MCNC: 0.5 MG/DL (ref 0–1.2)
BILIRUB UR QL STRIP: NEGATIVE
BLD GP AB SCN SERPL QL: NEGATIVE
BUN SERPL-MCNC: 12 MG/DL (ref 6–20)
BUN SERPL-MCNC: 13 MG/DL (ref 6–20)
BUN SERPL-MCNC: 14 MG/DL (ref 6–20)
BUN/CREAT SERPL: 15.9 (ref 7–25)
BUN/CREAT SERPL: 16.9 (ref 7–25)
BUN/CREAT SERPL: 17.4 (ref 7–25)
CALCIUM SPEC-SCNC: 8.7 MG/DL (ref 8.6–10.5)
CALCIUM SPEC-SCNC: 9 MG/DL (ref 8.6–10.5)
CALCIUM SPEC-SCNC: 9.2 MG/DL (ref 8.6–10.5)
CHLORIDE SERPL-SCNC: 102 MMOL/L (ref 98–107)
CHLORIDE SERPL-SCNC: 104 MMOL/L (ref 98–107)
CHLORIDE SERPL-SCNC: 107 MMOL/L (ref 98–107)
CLARITY UR: CLEAR
CO2 SERPL-SCNC: 20 MMOL/L (ref 22–29)
CO2 SERPL-SCNC: 21 MMOL/L (ref 22–29)
CO2 SERPL-SCNC: 23 MMOL/L (ref 22–29)
COLOR UR: ABNORMAL
CREAT SERPL-MCNC: 0.69 MG/DL (ref 0.57–1)
CREAT SERPL-MCNC: 0.77 MG/DL (ref 0.57–1)
CREAT SERPL-MCNC: 0.88 MG/DL (ref 0.57–1)
DEPRECATED RDW RBC AUTO: 42.8 FL (ref 37–54)
DEPRECATED RDW RBC AUTO: 42.8 FL (ref 37–54)
EGFRCR SERPLBLD CKD-EPI 2021: 104.6 ML/MIN/1.73
EGFRCR SERPLBLD CKD-EPI 2021: 117.7 ML/MIN/1.73
EGFRCR SERPLBLD CKD-EPI 2021: 89.1 ML/MIN/1.73
ELLIPTOCYTES BLD QL SMEAR: ABNORMAL
EOSINOPHIL # BLD AUTO: 0.11 10*3/MM3 (ref 0–0.4)
EOSINOPHIL # BLD MANUAL: 0.36 10*3/MM3 (ref 0–0.4)
EOSINOPHIL NFR BLD AUTO: 1.2 % (ref 0.3–6.2)
EOSINOPHIL NFR BLD MANUAL: 3 % (ref 0.3–6.2)
ERYTHROCYTE [DISTWIDTH] IN BLOOD BY AUTOMATED COUNT: 15 % (ref 12.3–15.4)
ERYTHROCYTE [DISTWIDTH] IN BLOOD BY AUTOMATED COUNT: 15.1 % (ref 12.3–15.4)
FIBRINOGEN PPP-MCNC: 664 MG/DL (ref 228–514)
GLOBULIN UR ELPH-MCNC: 2.6 GM/DL
GLOBULIN UR ELPH-MCNC: 2.9 GM/DL
GLOBULIN UR ELPH-MCNC: 3.5 GM/DL
GLUCOSE SERPL-MCNC: 90 MG/DL (ref 65–99)
GLUCOSE SERPL-MCNC: 91 MG/DL (ref 65–99)
GLUCOSE SERPL-MCNC: 98 MG/DL (ref 65–99)
GLUCOSE UR STRIP-MCNC: NEGATIVE MG/DL
HCT VFR BLD AUTO: 17.8 % (ref 34–46.6)
HCT VFR BLD AUTO: 20.8 % (ref 34–46.6)
HCT VFR BLD AUTO: 23 % (ref 34–46.6)
HGB BLD-MCNC: 5.7 G/DL (ref 12–15.9)
HGB BLD-MCNC: 6.6 G/DL (ref 12–15.9)
HGB BLD-MCNC: 7.5 G/DL (ref 12–15.9)
HGB UR QL STRIP.AUTO: ABNORMAL
HOLD SPECIMEN: NORMAL
HOLD SPECIMEN: NORMAL
HYALINE CASTS UR QL AUTO: ABNORMAL /LPF
IMM GRANULOCYTES # BLD AUTO: 0.11 10*3/MM3 (ref 0–0.05)
IMM GRANULOCYTES NFR BLD AUTO: 1.2 % (ref 0–0.5)
INR PPP: 1.01 (ref 0.8–1.2)
KETONES UR QL STRIP: NEGATIVE
LDH SERPL-CCNC: 371 U/L (ref 135–214)
LEUKOCYTE ESTERASE UR QL STRIP.AUTO: ABNORMAL
LYMPHOCYTES # BLD AUTO: 1.65 10*3/MM3 (ref 0.7–3.1)
LYMPHOCYTES # BLD MANUAL: 2.52 10*3/MM3 (ref 0.7–3.1)
LYMPHOCYTES NFR BLD AUTO: 17.6 % (ref 19.6–45.3)
LYMPHOCYTES NFR BLD MANUAL: 1 % (ref 5–12)
Lab: NORMAL
MCH RBC QN AUTO: 25 PG (ref 26.6–33)
MCH RBC QN AUTO: 25.1 PG (ref 26.6–33)
MCHC RBC AUTO-ENTMCNC: 31.7 G/DL (ref 31.5–35.7)
MCHC RBC AUTO-ENTMCNC: 32 G/DL (ref 31.5–35.7)
MCV RBC AUTO: 78.4 FL (ref 79–97)
MCV RBC AUTO: 78.8 FL (ref 79–97)
MONOCYTES # BLD AUTO: 0.47 10*3/MM3 (ref 0.1–0.9)
MONOCYTES # BLD: 0.12 10*3/MM3 (ref 0.1–0.9)
MONOCYTES NFR BLD AUTO: 5 % (ref 5–12)
NEUTROPHILS # BLD AUTO: 8.99 10*3/MM3 (ref 1.7–7)
NEUTROPHILS NFR BLD AUTO: 6.99 10*3/MM3 (ref 1.7–7)
NEUTROPHILS NFR BLD AUTO: 74.7 % (ref 42.7–76)
NEUTROPHILS NFR BLD MANUAL: 75 % (ref 42.7–76)
NITRITE UR QL STRIP: NEGATIVE
NRBC BLD AUTO-RTO: 0.2 /100 WBC (ref 0–0.2)
PH UR STRIP.AUTO: 6.5 [PH] (ref 5–9)
PLATELET # BLD AUTO: 179 10*3/MM3 (ref 140–450)
PLATELET # BLD AUTO: 200 10*3/MM3 (ref 140–450)
PMV BLD AUTO: 11.8 FL (ref 6–12)
PMV BLD AUTO: 12.4 FL (ref 6–12)
POTASSIUM SERPL-SCNC: 4.3 MMOL/L (ref 3.5–5.2)
POTASSIUM SERPL-SCNC: 4.5 MMOL/L (ref 3.5–5.2)
POTASSIUM SERPL-SCNC: 4.6 MMOL/L (ref 3.5–5.2)
PROT SERPL-MCNC: 5.5 G/DL (ref 6–8.5)
PROT SERPL-MCNC: 5.7 G/DL (ref 6–8.5)
PROT SERPL-MCNC: 6.8 G/DL (ref 6–8.5)
PROT UR QL STRIP: ABNORMAL
PROTHROMBIN TIME: 13.3 SECONDS (ref 11.1–15.3)
RBC # BLD AUTO: 2.27 10*6/MM3 (ref 3.77–5.28)
RBC # BLD AUTO: 2.64 10*6/MM3 (ref 3.77–5.28)
RBC # UR STRIP: ABNORMAL /HPF
REF LAB TEST METHOD: ABNORMAL
RH BLD: POSITIVE
SMALL PLATELETS BLD QL SMEAR: ADEQUATE
SODIUM SERPL-SCNC: 135 MMOL/L (ref 136–145)
SODIUM SERPL-SCNC: 139 MMOL/L (ref 136–145)
SODIUM SERPL-SCNC: 140 MMOL/L (ref 136–145)
SP GR UR STRIP: 1 (ref 1–1.03)
SQUAMOUS #/AREA URNS HPF: ABNORMAL /HPF
T&S EXPIRATION DATE: NORMAL
URATE SERPL-MCNC: 6.1 MG/DL (ref 2.4–5.7)
UROBILINOGEN UR QL STRIP: ABNORMAL
VARIANT LYMPHS NFR BLD MANUAL: 21 % (ref 19.6–45.3)
WBC # UR STRIP: ABNORMAL /HPF
WBC MORPH BLD: NORMAL
WBC NRBC COR # BLD: 11.99 10*3/MM3 (ref 3.4–10.8)
WBC NRBC COR # BLD: 9.36 10*3/MM3 (ref 3.4–10.8)
WHOLE BLOOD HOLD SPECIMEN: NORMAL

## 2023-07-09 PROCEDURE — 36415 COLL VENOUS BLD VENIPUNCTURE: CPT | Performed by: FAMILY MEDICINE

## 2023-07-09 PROCEDURE — 80053 COMPREHEN METABOLIC PANEL: CPT | Performed by: FAMILY MEDICINE

## 2023-07-09 PROCEDURE — 86850 RBC ANTIBODY SCREEN: CPT | Performed by: OBSTETRICS & GYNECOLOGY

## 2023-07-09 PROCEDURE — 80053 COMPREHEN METABOLIC PANEL: CPT | Performed by: OBSTETRICS & GYNECOLOGY

## 2023-07-09 PROCEDURE — G0378 HOSPITAL OBSERVATION PER HR: HCPCS

## 2023-07-09 PROCEDURE — 36430 TRANSFUSION BLD/BLD COMPNT: CPT

## 2023-07-09 PROCEDURE — 86900 BLOOD TYPING SEROLOGIC ABO: CPT | Performed by: OBSTETRICS & GYNECOLOGY

## 2023-07-09 PROCEDURE — 85025 COMPLETE CBC W/AUTO DIFF WBC: CPT | Performed by: FAMILY MEDICINE

## 2023-07-09 PROCEDURE — 76856 US EXAM PELVIC COMPLETE: CPT

## 2023-07-09 PROCEDURE — 99221 1ST HOSP IP/OBS SF/LOW 40: CPT | Performed by: OBSTETRICS & GYNECOLOGY

## 2023-07-09 PROCEDURE — 85014 HEMATOCRIT: CPT | Performed by: OBSTETRICS & GYNECOLOGY

## 2023-07-09 PROCEDURE — 85018 HEMOGLOBIN: CPT | Performed by: OBSTETRICS & GYNECOLOGY

## 2023-07-09 PROCEDURE — 86923 COMPATIBILITY TEST ELECTRIC: CPT

## 2023-07-09 PROCEDURE — 86901 BLOOD TYPING SEROLOGIC RH(D): CPT | Performed by: OBSTETRICS & GYNECOLOGY

## 2023-07-09 PROCEDURE — P9016 RBC LEUKOCYTES REDUCED: HCPCS

## 2023-07-09 PROCEDURE — 86900 BLOOD TYPING SEROLOGIC ABO: CPT

## 2023-07-09 RX ORDER — LABETALOL 200 MG/1
200 TABLET, FILM COATED ORAL ONCE
Status: COMPLETED | OUTPATIENT
Start: 2023-07-09 | End: 2023-07-09

## 2023-07-09 RX ORDER — PRENATAL VIT/IRON FUM/FOLIC AC 27MG-0.8MG
1 TABLET ORAL DAILY
Status: DISCONTINUED | OUTPATIENT
Start: 2023-07-09 | End: 2023-07-11 | Stop reason: HOSPADM

## 2023-07-09 RX ORDER — ACETAMINOPHEN 325 MG/1
650 TABLET ORAL EVERY 8 HOURS
Status: DISCONTINUED | OUTPATIENT
Start: 2023-07-09 | End: 2023-07-11 | Stop reason: HOSPADM

## 2023-07-09 RX ORDER — FERROUS SULFATE TAB EC 324 MG (65 MG FE EQUIVALENT) 324 (65 FE) MG
324 TABLET DELAYED RESPONSE ORAL 2 TIMES DAILY WITH MEALS
Status: DISCONTINUED | OUTPATIENT
Start: 2023-07-09 | End: 2023-07-11 | Stop reason: HOSPADM

## 2023-07-09 RX ORDER — NIFEDIPINE 30 MG/1
30 TABLET, EXTENDED RELEASE ORAL
Status: DISCONTINUED | OUTPATIENT
Start: 2023-07-09 | End: 2023-07-10

## 2023-07-09 RX ORDER — IBUPROFEN 800 MG/1
800 TABLET ORAL EVERY 8 HOURS SCHEDULED
Status: DISCONTINUED | OUTPATIENT
Start: 2023-07-09 | End: 2023-07-11 | Stop reason: HOSPADM

## 2023-07-09 RX ORDER — SODIUM CHLORIDE 0.9 % (FLUSH) 0.9 %
3 SYRINGE (ML) INJECTION EVERY 8 HOURS
Status: DISCONTINUED | OUTPATIENT
Start: 2023-07-09 | End: 2023-07-11 | Stop reason: HOSPADM

## 2023-07-09 RX ORDER — LABETALOL 200 MG/1
200 TABLET, FILM COATED ORAL EVERY 8 HOURS SCHEDULED
Status: DISCONTINUED | OUTPATIENT
Start: 2023-07-09 | End: 2023-07-11 | Stop reason: HOSPADM

## 2023-07-09 RX ADMIN — ACETAMINOPHEN 650 MG: 325 TABLET, FILM COATED ORAL at 10:04

## 2023-07-09 RX ADMIN — FERROUS SULFATE TAB EC 324 MG (65 MG FE EQUIVALENT) 324 MG: 324 (65 FE) TABLET DELAYED RESPONSE at 07:36

## 2023-07-09 RX ADMIN — LABETALOL HYDROCHLORIDE 200 MG: 200 TABLET, FILM COATED ORAL at 10:04

## 2023-07-09 RX ADMIN — LABETALOL HYDROCHLORIDE 200 MG: 200 TABLET, FILM COATED ORAL at 02:17

## 2023-07-09 RX ADMIN — IBUPROFEN 800 MG: 800 TABLET, FILM COATED ORAL at 07:36

## 2023-07-09 RX ADMIN — ACETAMINOPHEN 650 MG: 325 TABLET, FILM COATED ORAL at 17:03

## 2023-07-09 RX ADMIN — FERROUS SULFATE TAB EC 324 MG (65 MG FE EQUIVALENT) 324 MG: 324 (65 FE) TABLET DELAYED RESPONSE at 17:04

## 2023-07-09 RX ADMIN — PRENATAL VIT W/ FE FUMARATE-FA TAB 27-0.8 MG 1 TABLET: 27-0.8 TAB at 08:02

## 2023-07-09 RX ADMIN — Medication 3 ML: at 21:30

## 2023-07-09 RX ADMIN — NIFEDIPINE 30 MG: 30 TABLET, FILM COATED, EXTENDED RELEASE ORAL at 17:30

## 2023-07-09 RX ADMIN — IBUPROFEN 800 MG: 800 TABLET, FILM COATED ORAL at 17:04

## 2023-07-09 RX ADMIN — LABETALOL HYDROCHLORIDE 200 MG: 200 TABLET, FILM COATED ORAL at 17:04

## 2023-07-09 NOTE — ED NOTES
Nursing report ED to floor  Sophie Ashraf  32 y.o.  female    HPI:   Chief Complaint   Patient presents with    Hypertension       Admitting doctor:   Walter Steve MD    Consulting provider(s):  Consults       No orders found for last 30 day(s).             Admitting diagnosis:   The encounter diagnosis was Pregnancy induced hypertension, antepartum.    Code status:   Current Code Status       Date Active Code Status Order ID Comments User Context       Prior            Allergies:   Patient has no known allergies.    Intake and Output  No intake or output data in the 24 hours ending 07/09/23 0112    Weight:   There were no vitals filed for this visit.    Most recent vitals:   Vitals:    07/09/23 0020 07/09/23 0030 07/09/23 0050 07/09/23 0051   BP: 142/92 135/90 140/89    Pulse: 110 104 104 96   Resp:       Temp:       TempSrc:       SpO2: 99% 99% 99% 99%     Oxygen Therapy: room air    Active LDAs/IV Access:   Lines, Drains & Airways       Active LDAs       Name Placement date Placement time Site Days    Peripheral IV 07/08/23 2348 Right Antecubital 07/08/23  2348  Antecubital  less than 1                    Labs (abnormal labs have a star):   Labs Reviewed   URINALYSIS W/ MICROSCOPIC IF INDICATED (NO CULTURE) - Abnormal; Notable for the following components:       Result Value    Color, UA Red (*)     Blood, UA Large (3+) (*)     Protein, UA Trace (*)     Leuk Esterase, UA Small (1+) (*)     All other components within normal limits   FIBRINOGEN - Abnormal; Notable for the following components:    Fibrinogen 664 (*)     All other components within normal limits   COMPREHENSIVE METABOLIC PANEL - Abnormal; Notable for the following components:    CO2 20.0 (*)     Albumin 3.3 (*)     ALT (SGPT) 86 (*)     AST (SGOT) 152 (*)     Alkaline Phosphatase 135 (*)     All other components within normal limits    Narrative:     GFR Normal >60  Chronic Kidney Disease <60  Kidney Failure <15     URIC ACID -  Abnormal; Notable for the following components:    Uric Acid 6.1 (*)     All other components within normal limits   LACTATE DEHYDROGENASE - Abnormal; Notable for the following components:     (*)     All other components within normal limits   CBC WITH AUTO DIFFERENTIAL - Abnormal; Notable for the following components:    WBC 11.99 (*)     RBC 2.64 (*)     Hemoglobin 6.6 (*)     Hematocrit 20.8 (*)     MCV 78.8 (*)     MCH 25.0 (*)     MPV 12.4 (*)     All other components within normal limits   URINALYSIS, MICROSCOPIC ONLY - Abnormal; Notable for the following components:    RBC, UA Too Numerous to Count (*)     WBC, UA 6-12 (*)     All other components within normal limits   PROTIME-INR - Normal    Narrative:     Therapeutic range for most indications is 2.0-3.0 INR,  or 2.5-3.5 for mechanical heart valves.   APTT - Normal    Narrative:     The recommended Heparin therapeutic range is 68-97 seconds.   MANUAL DIFFERENTIAL   COMPREHENSIVE METABOLIC PANEL   CBC AND DIFFERENTIAL    Narrative:     The following orders were created for panel order CBC & Differential.  Procedure                               Abnormality         Status                     ---------                               -----------         ------                     CBC Auto Differential[[id number]]        Abnormal            Final result               Scan Slide[[id number]]                                                                    Please view results for these tests on the individual orders.   LAVENDER TOP   GOLD TOP - SST   EXTRA TUBES    Narrative:     The following orders were created for panel order Extra Tubes.  Procedure                               Abnormality         Status                     ---------                               -----------         ------                     Lavender Top[[id number]]                                     Final result               Gold Top - SST[[id number]]                                    Final result               Burgos Top[[id number]]                                         In process                   Please view results for these tests on the individual orders.   GRAY TOP   CBC AND DIFFERENTIAL       Meds given in ED:   Medications   labetalol (NORMODYNE,TRANDATE) injection 20 mg (0 mg Intravenous Hold 7/9/23 0029)           NIH Stroke Scale:       Isolation/Infection(s):  No active isolations   No active infections     COVID Testing  Collected NA  Resulted NA    Nursing report ED to floor:  Mental status: A/O  Ambulatory status: independent  Precautions: fall    ED nurse phone extentsion- [phone] report ED to floor  Sophie Ashraf  32 y.o.  female    HPI:   Chief Complaint   Patient presents with    Hypertension       Admitting doctor:   Walter Steve MD    Consulting provider(s):  Consults       No orders found for last 30 day(s).             Admitting diagnosis:   The encounter diagnosis was Pregnancy induced hypertension, antepartum.    Code status:   Current Code Status       Date Active Code Status Order ID Comments User Context       Prior            Allergies:   Patient has no known allergies.    Intake and Output  No intake or output data in the 24 hours ending 07/09/23 0112    Weight:   There were no vitals filed for this visit.    Most recent vitals:   Vitals:    07/09/23 0020 07/09/23 0030 07/09/23 0050 07/09/23 0051   BP: 142/92 135/90 140/89    Pulse: 110 104 104 96   Resp:       Temp:       TempSrc:       SpO2: 99% 99% 99% 99%     Oxygen Therapy: room air    Active LDAs/IV Access:   Lines, Drains & Airways       Active LDAs       Name Placement date Placement time Site Days    Peripheral IV 07/08/23 2348 Right Antecubital 07/08/23 2348  Antecubital  less than 1                    Labs (abnormal labs have a star):   Labs Reviewed   URINALYSIS W/ MICROSCOPIC IF INDICATED (NO CULTURE) - Abnormal; Notable for the following components:       Result Value    Color, UA Red  (*)     Blood, UA Large (3+) (*)     Protein, UA Trace (*)     Leuk Esterase, UA Small (1+) (*)     All other components within normal limits   FIBRINOGEN - Abnormal; Notable for the following components:    Fibrinogen 664 (*)     All other components within normal limits   COMPREHENSIVE METABOLIC PANEL - Abnormal; Notable for the following components:    CO2 20.0 (*)     Albumin 3.3 (*)     ALT (SGPT) 86 (*)     AST (SGOT) 152 (*)     Alkaline Phosphatase 135 (*)     All other components within normal limits    Narrative:     GFR Normal >60  Chronic Kidney Disease <60  Kidney Failure <15     URIC ACID - Abnormal; Notable for the following components:    Uric Acid 6.1 (*)     All other components within normal limits   LACTATE DEHYDROGENASE - Abnormal; Notable for the following components:     (*)     All other components within normal limits   CBC WITH AUTO DIFFERENTIAL - Abnormal; Notable for the following components:    WBC 11.99 (*)     RBC 2.64 (*)     Hemoglobin 6.6 (*)     Hematocrit 20.8 (*)     MCV 78.8 (*)     MCH 25.0 (*)     MPV 12.4 (*)     All other components within normal limits   URINALYSIS, MICROSCOPIC ONLY - Abnormal; Notable for the following components:    RBC, UA Too Numerous to Count (*)     WBC, UA 6-12 (*)     All other components within normal limits   PROTIME-INR - Normal    Narrative:     Therapeutic range for most indications is 2.0-3.0 INR,  or 2.5-3.5 for mechanical heart valves.   APTT - Normal    Narrative:     The recommended Heparin therapeutic range is 68-97 seconds.   MANUAL DIFFERENTIAL   COMPREHENSIVE METABOLIC PANEL   CBC AND DIFFERENTIAL    Narrative:     The following orders were created for panel order CBC & Differential.  Procedure                               Abnormality         Status                     ---------                               -----------         ------                     CBC Auto Differential[[id number]]        Abnormal            Final result                Scan Slide[[id number]]                                                                    Please view results for these tests on the individual orders.   LAVENDER TOP   GOLD TOP - SST   EXTRA TUBES    Narrative:     The following orders were created for panel order Extra Tubes.  Procedure                               Abnormality         Status                     ---------                               -----------         ------                     Lavender Top[[id number]]                                     Final result               Gold Top - SST[[id number]]                                   Final result               Ubrgos Top[[id number]]                                         In process                   Please view results for these tests on the individual orders.   GRAY TOP   CBC AND DIFFERENTIAL       Meds given in ED:   Medications   labetalol (NORMODYNE,TRANDATE) injection 20 mg (0 mg Intravenous Hold 23 0029)           NIH Stroke Scale:       Isolation/Infection(s):  No active isolations   No active infections     COVID Testing  Collected NA  Resulted NS    Nursing report ED to floor:  Mental status: A/O  Ambulatory status: independent   Precautions: fall;  on 23    ED nurse phone extentsion- [phone]

## 2023-07-09 NOTE — H&P
Sophie Woodward  : 1990  MRN: [id number]  CSN: [id number]    History    Sophie Woodward is a 32 y.o.  status post primary  on  presented to the emergency room with a chief complaint of elevated blood pressures.  The patient had been discharged earlier in the day on her fourth postpartum day.  Her blood pressures had been good and she looked well.    OB History    Para Term  AB Living   1 1 1 0 0 1   SAB IAB Ectopic Molar Multiple Live Births   0 0 0 0 0 1      # Outcome Date GA Lbr Wayne/2nd Weight Sex Delivery Anes PTL Lv   1 Term 23 38w5d  3180 g (7 lb 0.2 oz) F CS-LTranv Spinal N JUJU      Name: DELLA WOODWARD      Apgar1: 9  Apgar5: 9     Past Medical History:   Diagnosis Date   • Anxiety    • Graves disease    • Hashimoto's disease    • Thyroid disease      Past Surgical History:   Procedure Laterality Date   •  SECTION N/A 2023    Procedure:  SECTION PRIMARY;  Surgeon: Garima Ross MD;  Location: [facility] LABOR DELIVERY;  Service: Obstetrics;  Laterality: N/A;       Current Facility-Administered Medications:   •  labetalol (NORMODYNE,TRANDATE) injection 20 mg, 20 mg, Intravenous, Once, David Olea MD    Current Outpatient Medications:   •  famotidine (PEPCID) 40 MG tablet, Take 0.5 tablets by mouth Every Night., Disp: 30 tablet, Rfl: 2  •  ibuprofen (ADVIL,MOTRIN) 800 MG tablet, Take 1 tablet by mouth Every 8 (Eight) Hours., Disp: 30 tablet, Rfl: 0  •  oxyCODONE (ROXICODONE) 5 MG immediate release tablet, Take 1 tablet by mouth Every 4 (Four) Hours As Needed for Moderate Pain for up to 4 days., Disp: 12 tablet, Rfl: 0  •  pantoprazole (PROTONIX) 40 MG EC tablet, Take 1 tablet by mouth Every Morning., Disp: 30 tablet, Rfl: 2  •  Prenatal Vit-Fe Fumarate-FA (Prenatal Vitamin) 27-0.8 MG tablet, Take 1 tablet by mouth Daily., Disp: , Rfl:     No Known Allergies  Social History    Tobacco Use       Smoking status: Never      Smokeless tobacco: Never    Family History   Problem Relation Age of Onset   • Thyroid disease Mother    • Kidney cancer Mother    • No Known Problems Father    • Thyroid disease Sister    • Hashimoto's thyroiditis Sister    • No Known Problems Brother    • No Known Problems Brother    • Breast cancer Maternal Grandmother    • Lung cancer Maternal Grandmother    • Leukemia Maternal Grandfather    • Diabetes Paternal Grandmother    • Lung cancer Paternal Grandfather          Review of Systems   Constitutional: Negative for chills and fever.   Gastrointestinal: Negative for abdominal pain.   Genitourinary: Negative for pelvic pain.   Neurological: Positive for headaches.   All other systems reviewed and are negative.      Physical Exam    /94   Pulse 94   Temp 98.7 °F (37.1 °C) (Oral)   Resp 18   LMP 10/11/2022 (Within Days)   SpO2 98%   Breastfeeding No   General: well developed; well nourished  no acute distress   Heart: regular rate and rhythm, S1, S2 normal, no murmur, click, rub or gallop   Lungs: breathing is unlabored   Abdomen: soft, non-tender; no masses  no umbilical or inguinal hernias are present  no hepato-splenomegaly                   Extremities:  +2 pitting edema, 2-3+ deep tendon reflexes     Prenatal Labs  Lab Results   Component Value Date    HEPBSAG Non-Reactive 11/23/2022    ABO A 07/04/2023    RH Positive 07/04/2023    ABSCRN Negative 07/04/2023    HEPCVIRUSABY Non-Reactive 11/23/2022       Last Labs  Lab Results   Component Value Date    HGB 6.6 (C) 07/08/2023     07/08/2023     (H) 07/08/2023    ALT 86 (H) 07/08/2023    URICACID 6.1 (H) 07/08/2023    BUN 13 07/08/2023    CREATININE 0.77 07/08/2023    GLUCOSE 98 07/08/2023        Imaging   No data reviewed     Assessment   1. Partum preeclampsia  2. Acute blood loss anemia     Plan   1. Observation admission  2. Recheck LFTs and CBC in a.m.  3. Orthostatic blood pressure and pulse  4. Follow  blood pressure, may need oral medication        This document has been electronically signed by Walter Steve MD on July 9, 2023 01:45 CDT

## 2023-07-09 NOTE — NURSING NOTE
Discussing with patient strong recommendation and need for blood transfusion. Patient asking about the screening process and risk about the transfusion, RN educated on risk and benefit of blood transfusion and risk of low hemoglobin. Pt still hesitant and wanting to talk it over with her . MD aware

## 2023-07-09 NOTE — PLAN OF CARE
Goal Outcome Evaluation:  Plan of Care Reviewed With: patient, spouse        Progress: improving  Outcome Evaluation: Transfused 2 units PRBC's, H&H recheck post transfusion increased from 5.7/17.8 to 7.5/23.0, pt denies pain, ambulating in room without symptoms, US results pending, CBC and CMP to be rechecked in the am, procardia added due to elevated BP.

## 2023-07-09 NOTE — PROGRESS NOTES
"[facility]  Progress Note - Obstetrics    Name: Sophie Ashraf  MRN: [id number]  Location: [facility] [place]  Date: 2023  CSN: [id number]        Sophie Ashraf is a 32 y.o.  status post primary  who presented on  to the emergency room with a chief complaint of elevated blood pressures.  The patient had been discharged earlier in the day on her fourth postpartum day.  Her blood pressures had been good and she looked well.     Patient seen and examined.  Complains of high blood pressure readings with a home machine the highest being 165/115 by her sister whom is a RN.  Pain well controlled.  Tolerating diet.  No nausea or vomiting.  No headache, dizziness, chest pain, or shortness of breath. Passing flatus, normal bowel movement.  Up and out of bed and ambulating.  Voiding without difficulty.  Lochia minimal. Bottle feeding.    PHYSICAL EXAM  /77   Pulse 103   Temp 98.8 °F (37.1 °C) (Oral)   Resp 18   Ht 167.6 cm (66\")   Wt 89.4 kg (197 lb)   LMP 10/11/2022 (Within Days)   SpO2 97%   Breastfeeding No   BMI 31.80 kg/m²   General: No apparent distress.  Alert and cooperative.  Pleasant.  Heart: Regular rate and rhythm.  No murmurs, rubs, or gallops.  Lungs: Clear to auscultation bilaterally.  No wheezes, rales, or rhonchi.  Abdomen: Soft.  Non tender to palpation.  No rebound tenderness or guarding.  +BS.  Dressing/Incision: Clean, dry, and intact.  No erythema or warmth.    Extremities: +2/4 posterior tibial.  Moderate pitting edema in lower extremities.  No calf tenderness. Normal patellar reflexes  Uterus: Uterine fundus firm, non-tender and below umbilicus.    No intake or output data in the 24 hours ending 23 0740    LABS  Hgb: 6.6 -> 5.7    IMPRESSION  Sophie Ashraf is a 32 y.o.  POD #4 s/p LTCS at 38w5d.  Doing well and recovering appropriately from C.section. Patients BP currently is well controlled 124/77 on blood " pressure medication    PLAN  1.  Following surgery  - Continue routine post op care  - Encourage OOB and ambulation  - Encourage incentive spirometry  - Diet: Pregnancy/breastfeeding  - DVT prophylaxis: Ambulation  - Contraception: Vasectomy   - Bottle feeding  - Discharge patient home to be decided.      2.  Continue blood pressure medications and continuous blood pressure monitoring    3-Patient will receive today 7/9  2 Units of blood. Monitor closely for any reaction.    This document has been electronically signed by Lakeisha Suh MD on July 9, 2023 07:40 CDT.

## 2023-07-09 NOTE — ED PROVIDER NOTES
Subjective   History of Present Illness  Patient is a 32 years old status post primary  5 days postpartum and patient had preeclampsia during the delivery presented here today because of elevated blood pressure.  Denies any epigastric pain, denies any headache, denies any visual changes.  Patient did not go home with any blood pressure medication.      Review of Systems   Cardiovascular:  Positive for leg swelling.   All other systems reviewed and are negative.    Past Medical History:   Diagnosis Date    Anxiety     Graves disease     Hashimoto's disease     Thyroid disease        No Known Allergies    Past Surgical History:   Procedure Laterality Date     SECTION N/A 2023    Procedure:  SECTION PRIMARY;  Surgeon: Garima Ross MD;  Location: [facility] LABOR DELIVERY;  Service: Obstetrics;  Laterality: N/A;       Family History   Problem Relation Age of Onset    Thyroid disease Mother     Kidney cancer Mother     No Known Problems Father     Thyroid disease Sister     Hashimoto's thyroiditis Sister     No Known Problems Brother     No Known Problems Brother     Breast cancer Maternal Grandmother     Lung cancer Maternal Grandmother     Leukemia Maternal Grandfather     Diabetes Paternal Grandmother     Lung cancer Paternal Grandfather        Social History     Socioeconomic History    Marital status:    Tobacco Use    Smoking status: Never    Smokeless tobacco: Never   Vaping Use    Vaping Use: Former   Substance and Sexual Activity    Alcohol use: Not Currently    Drug use: Never    Sexual activity: Yes     Partners: Male     Comment: patient has not had a pap smear since            Objective   Physical Exam  Vitals and nursing note reviewed.   Constitutional:       Appearance: Normal appearance. She is obese.   HENT:      Head: Normocephalic and atraumatic.      Right Ear: Tympanic membrane, ear canal and external ear normal.      Left Ear: Tympanic membrane,  ear canal and external ear normal.      Nose: Nose normal.   Eyes:      Extraocular Movements: Extraocular movements intact.      Conjunctiva/sclera: Conjunctivae normal.      Pupils: Pupils are equal, round, and reactive to light.   Cardiovascular:      Rate and Rhythm: Normal rate and regular rhythm.      Pulses: Normal pulses.      Heart sounds: Normal heart sounds.   Pulmonary:      Effort: Pulmonary effort is normal.      Breath sounds: Normal breath sounds.   Abdominal:      General: Abdomen is flat. Bowel sounds are normal.      Palpations: Abdomen is soft.   Musculoskeletal:         General: Swelling present. Normal range of motion.      Cervical back: Normal range of motion and neck supple.   Skin:     General: Skin is warm.      Capillary Refill: Capillary refill takes less than 2 seconds.   Neurological:      General: No focal deficit present.      Mental Status: She is alert and oriented to person, place, and time.   Psychiatric:         Mood and Affect: Mood normal.         Behavior: Behavior normal.       Procedures           ED Course  ED Course as of 07/09/23 0106   Sun Jul 09, 2023   0056 Spoke to OB/GYN Dr. Steve who said that patient need will be admitted for observation to repeat CBC and CMP in the morning. [MO]      ED Course User Index  [MO] David Olea MD           Labs Reviewed   URINALYSIS W/ MICROSCOPIC IF INDICATED (NO CULTURE) - Abnormal; Notable for the following components:       Result Value    Color, UA Red (*)     Blood, UA Large (3+) (*)     Protein, UA Trace (*)     Leuk Esterase, UA Small (1+) (*)     All other components within normal limits   FIBRINOGEN - Abnormal; Notable for the following components:    Fibrinogen 664 (*)     All other components within normal limits   COMPREHENSIVE METABOLIC PANEL - Abnormal; Notable for the following components:    CO2 20.0 (*)     Albumin 3.3 (*)     ALT (SGPT) 86 (*)     AST (SGOT) 152 (*)     Alkaline Phosphatase 135 (*)      All other components within normal limits    Narrative:     GFR Normal >60  Chronic Kidney Disease <60  Kidney Failure <15     URIC ACID - Abnormal; Notable for the following components:    Uric Acid 6.1 (*)     All other components within normal limits   LACTATE DEHYDROGENASE - Abnormal; Notable for the following components:     (*)     All other components within normal limits   CBC WITH AUTO DIFFERENTIAL - Abnormal; Notable for the following components:    WBC 11.99 (*)     RBC 2.64 (*)     Hemoglobin 6.6 (*)     Hematocrit 20.8 (*)     MCV 78.8 (*)     MCH 25.0 (*)     MPV 12.4 (*)     All other components within normal limits   URINALYSIS, MICROSCOPIC ONLY - Abnormal; Notable for the following components:    RBC, UA Too Numerous to Count (*)     WBC, UA 6-12 (*)     All other components within normal limits   PROTIME-INR - Normal    Narrative:     Therapeutic range for most indications is 2.0-3.0 INR,  or 2.5-3.5 for mechanical heart valves.   APTT - Normal    Narrative:     The recommended Heparin therapeutic range is 68-97 seconds.   MANUAL DIFFERENTIAL   CBC AND DIFFERENTIAL    Narrative:     The following orders were created for panel order CBC & Differential.  Procedure                               Abnormality         Status                     ---------                               -----------         ------                     CBC Auto Differential[[id number]]        Abnormal            Final result               Scan Slide[[id number]]                                                                    Please view results for these tests on the individual orders.   LAVENDER TOP   GOLD TOP - SST   EXTRA TUBES    Narrative:     The following orders were created for panel order Extra Tubes.  Procedure                               Abnormality         Status                     ---------                               -----------         ------                     Lavender Top[[id number]]                                      Final result               Gold Top - [facility][[id number]]                                   Final result               Burgos Top[[id number]]                                         In process                   Please view results for these tests on the individual orders.   GRAY TOP       No orders to display                                      Medical Decision Making  Patient is a 32 years old 5 days status post  who presented here today because of elevated blood pressure.  Patient also had a preeclampsia during stay in the hospital.  Patient was given 24 hours magnesium in the hospital.  Blood pressure come down to normal without any blood pressure medication.  CBC showed hemoglobin 6.6, CMP showed elevated liver function test, LDH was elevated, uric acid was elevated, platelets is 200.  Spoke to OB/GYN Dr. Steve who accepted patient for observation.  1 CBC and CMP to be repeated in the morning.    Amount and/or Complexity of Data Reviewed  Labs: ordered.    Risk  Prescription drug management.        Final diagnoses:   Pregnancy induced hypertension, antepartum       ED Disposition  ED Disposition       ED Disposition   Decision to Admit    Condition   --    Comment   Level of Care: Observation Unit [28]   Diagnosis: Postpartum hypertension [865812]   Admitting Physician: DOMINGO STEVE [[id number]]   Attending Physician: DOMINGO STEVE [[id number]]                 No follow-up provider specified.       Medication List      No changes were made to your prescriptions during this visit.            David Olea MD  23 0106

## 2023-07-10 LAB
ALBUMIN SERPL-MCNC: 3.1 G/DL (ref 3.5–5.2)
ALBUMIN/GLOB SERPL: 1 G/DL
ALP SERPL-CCNC: 115 U/L (ref 39–117)
ALT SERPL W P-5'-P-CCNC: 82 U/L (ref 1–33)
ANION GAP SERPL CALCULATED.3IONS-SCNC: 12 MMOL/L (ref 5–15)
AST SERPL-CCNC: 81 U/L (ref 1–32)
BASOPHILS # BLD AUTO: 0.03 10*3/MM3 (ref 0–0.2)
BASOPHILS NFR BLD AUTO: 0.3 % (ref 0–1.5)
BH BB BLOOD EXPIRATION DATE: NORMAL
BH BB BLOOD EXPIRATION DATE: NORMAL
BH BB BLOOD TYPE BARCODE: NORMAL
BH BB BLOOD TYPE BARCODE: NORMAL
BH BB DISPENSE STATUS: NORMAL
BH BB DISPENSE STATUS: NORMAL
BH BB PRODUCT CODE: NORMAL
BH BB PRODUCT CODE: NORMAL
BH BB UNIT NUMBER: NORMAL
BH BB UNIT NUMBER: NORMAL
BILIRUB SERPL-MCNC: 0.2 MG/DL (ref 0–1.2)
BUN SERPL-MCNC: 11 MG/DL (ref 6–20)
BUN/CREAT SERPL: 15.3 (ref 7–25)
CALCIUM SPEC-SCNC: 8.7 MG/DL (ref 8.6–10.5)
CHLORIDE SERPL-SCNC: 105 MMOL/L (ref 98–107)
CO2 SERPL-SCNC: 21 MMOL/L (ref 22–29)
CREAT SERPL-MCNC: 0.72 MG/DL (ref 0.57–1)
CROSSMATCH INTERPRETATION: NORMAL
CROSSMATCH INTERPRETATION: NORMAL
DEPRECATED RDW RBC AUTO: 45.9 FL (ref 37–54)
EGFRCR SERPLBLD CKD-EPI 2021: 113.4 ML/MIN/1.73
EOSINOPHIL # BLD AUTO: 0.12 10*3/MM3 (ref 0–0.4)
EOSINOPHIL NFR BLD AUTO: 1.2 % (ref 0.3–6.2)
ERYTHROCYTE [DISTWIDTH] IN BLOOD BY AUTOMATED COUNT: 16.1 % (ref 12.3–15.4)
GLOBULIN UR ELPH-MCNC: 3.1 GM/DL
GLUCOSE SERPL-MCNC: 83 MG/DL (ref 65–99)
HCT VFR BLD AUTO: 25 % (ref 34–46.6)
HGB BLD-MCNC: 8 G/DL (ref 12–15.9)
IMM GRANULOCYTES # BLD AUTO: 0.2 10*3/MM3 (ref 0–0.05)
IMM GRANULOCYTES NFR BLD AUTO: 1.9 % (ref 0–0.5)
LYMPHOCYTES # BLD AUTO: 1.54 10*3/MM3 (ref 0.7–3.1)
LYMPHOCYTES NFR BLD AUTO: 14.9 % (ref 19.6–45.3)
MCH RBC QN AUTO: 25.4 PG (ref 26.6–33)
MCHC RBC AUTO-ENTMCNC: 32 G/DL (ref 31.5–35.7)
MCV RBC AUTO: 79.4 FL (ref 79–97)
MONOCYTES # BLD AUTO: 0.5 10*3/MM3 (ref 0.1–0.9)
MONOCYTES NFR BLD AUTO: 4.8 % (ref 5–12)
NEUTROPHILS NFR BLD AUTO: 7.93 10*3/MM3 (ref 1.7–7)
NEUTROPHILS NFR BLD AUTO: 76.9 % (ref 42.7–76)
NRBC BLD AUTO-RTO: 0.3 /100 WBC (ref 0–0.2)
PLATELET # BLD AUTO: 209 10*3/MM3 (ref 140–450)
PMV BLD AUTO: 11.2 FL (ref 6–12)
POTASSIUM SERPL-SCNC: 4.2 MMOL/L (ref 3.5–5.2)
PROT SERPL-MCNC: 6.2 G/DL (ref 6–8.5)
RBC # BLD AUTO: 3.15 10*6/MM3 (ref 3.77–5.28)
SODIUM SERPL-SCNC: 138 MMOL/L (ref 136–145)
UNIT  ABO: NORMAL
UNIT  ABO: NORMAL
UNIT  RH: NORMAL
UNIT  RH: NORMAL
WBC NRBC COR # BLD: 10.32 10*3/MM3 (ref 3.4–10.8)

## 2023-07-10 PROCEDURE — 85025 COMPLETE CBC W/AUTO DIFF WBC: CPT | Performed by: OBSTETRICS & GYNECOLOGY

## 2023-07-10 PROCEDURE — G0378 HOSPITAL OBSERVATION PER HR: HCPCS

## 2023-07-10 PROCEDURE — 59160 D & C AFTER DELIVERY: CPT | Performed by: OBSTETRICS & GYNECOLOGY

## 2023-07-10 PROCEDURE — 80053 COMPREHEN METABOLIC PANEL: CPT | Performed by: OBSTETRICS & GYNECOLOGY

## 2023-07-10 RX ORDER — ONDANSETRON 2 MG/ML
4 INJECTION INTRAMUSCULAR; INTRAVENOUS ONCE AS NEEDED
Status: DISCONTINUED | OUTPATIENT
Start: 2023-07-10 | End: 2023-07-11 | Stop reason: HOSPADM

## 2023-07-10 RX ORDER — EPHEDRINE SULFATE 50 MG/ML
5 INJECTION, SOLUTION INTRAVENOUS ONCE AS NEEDED
Status: DISCONTINUED | OUTPATIENT
Start: 2023-07-10 | End: 2023-07-11 | Stop reason: HOSPADM

## 2023-07-10 RX ORDER — MISOPROSTOL 100 UG/1
TABLET ORAL AS NEEDED
Status: DISCONTINUED | OUTPATIENT
Start: 2023-07-10 | End: 2023-07-11 | Stop reason: HOSPADM

## 2023-07-10 RX ORDER — DIPHENHYDRAMINE HYDROCHLORIDE 50 MG/ML
12.5 INJECTION INTRAMUSCULAR; INTRAVENOUS
Status: DISCONTINUED | OUTPATIENT
Start: 2023-07-10 | End: 2023-07-11 | Stop reason: HOSPADM

## 2023-07-10 RX ORDER — PROMETHAZINE HYDROCHLORIDE 25 MG/1
25 SUPPOSITORY RECTAL ONCE AS NEEDED
Status: DISCONTINUED | OUTPATIENT
Start: 2023-07-10 | End: 2023-07-11 | Stop reason: HOSPADM

## 2023-07-10 RX ORDER — HYDROXYZINE PAMOATE 25 MG/1
CAPSULE ORAL
Status: DISPENSED
Start: 2023-07-10 | End: 2023-07-11

## 2023-07-10 RX ORDER — HYDROXYZINE PAMOATE 25 MG/1
25 CAPSULE ORAL ONCE
Status: COMPLETED | OUTPATIENT
Start: 2023-07-10 | End: 2023-07-10

## 2023-07-10 RX ORDER — NIFEDIPINE 60 MG/1
60 TABLET, EXTENDED RELEASE ORAL
Status: DISCONTINUED | OUTPATIENT
Start: 2023-07-11 | End: 2023-07-11 | Stop reason: HOSPADM

## 2023-07-10 RX ORDER — NIFEDIPINE 30 MG/1
30 TABLET, EXTENDED RELEASE ORAL ONCE
Status: COMPLETED | OUTPATIENT
Start: 2023-07-10 | End: 2023-07-10

## 2023-07-10 RX ORDER — FLUMAZENIL 0.1 MG/ML
0.2 INJECTION INTRAVENOUS AS NEEDED
Status: DISCONTINUED | OUTPATIENT
Start: 2023-07-10 | End: 2023-07-11 | Stop reason: HOSPADM

## 2023-07-10 RX ORDER — MEPERIDINE HYDROCHLORIDE 25 MG/ML
12.5 INJECTION INTRAMUSCULAR; INTRAVENOUS; SUBCUTANEOUS
Status: DISCONTINUED | OUTPATIENT
Start: 2023-07-10 | End: 2023-07-11 | Stop reason: HOSPADM

## 2023-07-10 RX ORDER — PROMETHAZINE HYDROCHLORIDE 25 MG/1
25 TABLET ORAL ONCE AS NEEDED
Status: DISCONTINUED | OUTPATIENT
Start: 2023-07-10 | End: 2023-07-11 | Stop reason: HOSPADM

## 2023-07-10 RX ORDER — NALOXONE HCL 0.4 MG/ML
0.4 VIAL (ML) INJECTION AS NEEDED
Status: DISCONTINUED | OUTPATIENT
Start: 2023-07-10 | End: 2023-07-11 | Stop reason: HOSPADM

## 2023-07-10 RX ADMIN — ACETAMINOPHEN 650 MG: 325 TABLET, FILM COATED ORAL at 11:23

## 2023-07-10 RX ADMIN — IBUPROFEN 800 MG: 800 TABLET, FILM COATED ORAL at 14:03

## 2023-07-10 RX ADMIN — IBUPROFEN 800 MG: 800 TABLET, FILM COATED ORAL at 21:49

## 2023-07-10 RX ADMIN — IBUPROFEN 800 MG: 800 TABLET, FILM COATED ORAL at 01:35

## 2023-07-10 RX ADMIN — FERROUS SULFATE TAB EC 324 MG (65 MG FE EQUIVALENT) 324 MG: 324 (65 FE) TABLET DELAYED RESPONSE at 18:16

## 2023-07-10 RX ADMIN — LABETALOL HYDROCHLORIDE 200 MG: 200 TABLET, FILM COATED ORAL at 14:03

## 2023-07-10 RX ADMIN — Medication 3 ML: at 14:03

## 2023-07-10 RX ADMIN — FERROUS SULFATE TAB EC 324 MG (65 MG FE EQUIVALENT) 324 MG: 324 (65 FE) TABLET DELAYED RESPONSE at 11:23

## 2023-07-10 RX ADMIN — HYDROXYZINE PAMOATE 25 MG: 25 CAPSULE ORAL at 22:53

## 2023-07-10 RX ADMIN — NIFEDIPINE 30 MG: 30 TABLET, FILM COATED, EXTENDED RELEASE ORAL at 11:23

## 2023-07-10 RX ADMIN — NIFEDIPINE 30 MG: 30 TABLET, FILM COATED, EXTENDED RELEASE ORAL at 19:13

## 2023-07-10 RX ADMIN — LABETALOL HYDROCHLORIDE 200 MG: 200 TABLET, FILM COATED ORAL at 01:35

## 2023-07-10 RX ADMIN — LABETALOL HYDROCHLORIDE 200 MG: 200 TABLET, FILM COATED ORAL at 21:49

## 2023-07-10 RX ADMIN — ACETAMINOPHEN 650 MG: 325 TABLET, FILM COATED ORAL at 18:16

## 2023-07-10 RX ADMIN — PRENATAL VIT W/ FE FUMARATE-FA TAB 27-0.8 MG 1 TABLET: 27-0.8 TAB at 11:23

## 2023-07-10 NOTE — OP NOTE
Preoperative diagnosis: Retained products of conception    Postoperative diagnosis: Same    Procedure: Suction D&C    Surgeon: Dr. Walter Steve    Assistant:Lakeisha Childs MD    Anesthesia: General    Complications: None    Estimated blood loss: 3 cc    Findings: Large amount of clot filling the uterus with a small amount of retained products of conception    Technique: After informed consent was obtained, and after the operative consent was signed and noted the patient was brought to the operating room placed on the operating room table in the supine position in preparation for her suction D&C.  After general anesthesia had been induced, the patient was placed in the yellowfin stirrups.  The patient was then routinely prepped and draped.  A timeout was performed.  A weighted speculum Marley retractor placed the patient vagina and the cervix was grasped with a Allis clamp.  Under ultrasound guidance, dilation was attempted, however the patient remained dilated from her delivery.  Using a #8 suction curette moderate amount of clot and tissue was obtained.  Using sharp curettage the remainder of the tissue was removed until characteristic gritty feeling was felt throughout the endometrium.  There was minimal additional bleeding.  200 mcg of Cytotec were placed and the patient vagina the patient tolerated the procedure well and goes to recovery in satisfactory condition.

## 2023-07-10 NOTE — PLAN OF CARE
Problem: Adult Inpatient Plan of Care  Goal: Plan of Care Review  Recent Flowsheet Documentation  Taken 7/10/2023 0513 by Marie Hercules RN  Progress: no change  Outcome Evaluation: pt doing well, no large clots or signs of overt bleeding noted, pain well controlled with scheduled meds, blood pressures remain borderline in 150s/90s - meds given as scheduled - no vision changes or c/o h/a. pt preparing self for procedure, question asked asked and answered.   Goal Outcome Evaluation:           Progress: no change  Outcome Evaluation: pt doing well, no large clots or signs of overt bleeding noted, pain well controlled with scheduled meds, blood pressures remain borderline in 150s/90s - meds given as scheduled - no vision changes or c/o h/a. pt preparing self for procedure, question asked asked and answered.

## 2023-07-10 NOTE — PROGRESS NOTES
[facility]   PROGRESS NOTE    Post-Op Day 5 S/P   Subjective   Subjective  Patient reports:  Pain is well controlled.She notes some mild to moderate bleeding overnight  Objective    Objective     Vitals: Vital Signs Range for the last 24 hours  Temperature: Temp:  [97.3 °F (36.3 °C)-99.1 °F (37.3 °C)] 98.8 °F (37.1 °C)   Temp Source: Temp src: Oral   BP: BP: (136-163)/(87-99) 152/93   Pulse: Heart Rate:  [] 106   Respirations: Resp:  [16-20] 18   SPO2: SpO2:  [95 %-99 %] 99 %   O2 Amount (l/min):     O2 Devices Device (Oxygen Therapy): room air   Weight:              Physical Exam    Lungs clear to auscultation bilaterally   Abdomen Soft, non-tender, normal bowel sounds; no bruits, organomegaly or masses.   Incision  healing well   Extremities extremities normal, atraumatic, no cyanosis or edema     I reviewed the patient's new clinical results.    Assessment & Plan        Postpartum hypertension    Status post  delivery  Acute blood loss anemia    Assessment & Plan    Assessment:    Sophie Ashraf is Day 5  post-partum    , Low Transverse     .      Plan:  suction D&E this am      Walter Steve MD  07/10/23  05:49 CDT

## 2023-07-10 NOTE — PROGRESS NOTES
I came to see the patient to discuss her pelvic ultrasound results.  The patient was sent for pelvic ultrasound secondary to bleeding with anemia.  The patient has already received 2 units of packed red blood cells.  Patient passed a clot, she was sent for an ultrasound.  The ultrasound shows a heterogeneous, predominantly echogenic material within the uterus.  No obvious color-flow Doppler signal is seen however it was still suspicious for retained products of conception.    I discussed the situation with the patient and how she may benefit from a suction D&C to remove the retained products of conception.      I discussed with the patient the risks and benefits of dilation and evacuation of the uterus.  The risks of D&C are infection, bleeding, uterine perforation, uterine infection, cervical laceration, uterine scarring, and hysterectomy.  All questions were answered.    Has been scheduled for 6 AM as the patient has recently eaten.  It may be sooner, emergently, if she begins to bleed heavily.    Walter Steve MD  7/9/2023 19:10 CDT

## 2023-07-10 NOTE — PLAN OF CARE
Goal Outcome Evaluation:  Plan of Care Reviewed With: patient, spouse           Outcome Evaluation: VSS, lochia light,  voids and ambulated to the bathroom. pain controlled with schedule meds. will continue monitor blood pressures and bleeding

## 2023-07-11 VITALS
BODY MASS INDEX: 31.66 KG/M2 | HEART RATE: 87 BPM | DIASTOLIC BLOOD PRESSURE: 92 MMHG | SYSTOLIC BLOOD PRESSURE: 138 MMHG | RESPIRATION RATE: 18 BRPM | TEMPERATURE: 97.8 F | WEIGHT: 197 LBS | HEIGHT: 66 IN | OXYGEN SATURATION: 97 %

## 2023-07-11 PROCEDURE — G0378 HOSPITAL OBSERVATION PER HR: HCPCS

## 2023-07-11 RX ORDER — NIFEDIPINE 60 MG/1
60 TABLET, FILM COATED, EXTENDED RELEASE ORAL
Qty: 30 TABLET | Refills: 2 | Status: SHIPPED | OUTPATIENT
Start: 2023-07-12 | End: 2023-08-06

## 2023-07-11 RX ORDER — LABETALOL 200 MG/1
200 TABLET, FILM COATED ORAL EVERY 8 HOURS SCHEDULED
Qty: 90 TABLET | Refills: 2 | Status: SHIPPED | OUTPATIENT
Start: 2023-07-11

## 2023-07-11 RX ORDER — HYDROXYZINE HYDROCHLORIDE 25 MG/1
25 TABLET, FILM COATED ORAL 3 TIMES DAILY PRN
Qty: 30 TABLET | Refills: 2 | Status: SHIPPED | OUTPATIENT
Start: 2023-07-11 | End: 2023-07-18

## 2023-07-11 RX ADMIN — Medication 3 ML: at 11:23

## 2023-07-11 RX ADMIN — ACETAMINOPHEN 650 MG: 325 TABLET, FILM COATED ORAL at 11:23

## 2023-07-11 RX ADMIN — LABETALOL HYDROCHLORIDE 200 MG: 200 TABLET, FILM COATED ORAL at 14:24

## 2023-07-11 RX ADMIN — FERROUS SULFATE TAB EC 324 MG (65 MG FE EQUIVALENT) 324 MG: 324 (65 FE) TABLET DELAYED RESPONSE at 08:33

## 2023-07-11 RX ADMIN — NIFEDIPINE 60 MG: 60 TABLET, EXTENDED RELEASE ORAL at 08:33

## 2023-07-11 RX ADMIN — IBUPROFEN 800 MG: 800 TABLET, FILM COATED ORAL at 05:58

## 2023-07-11 RX ADMIN — PRENATAL VIT W/ FE FUMARATE-FA TAB 27-0.8 MG 1 TABLET: 27-0.8 TAB at 08:33

## 2023-07-11 RX ADMIN — LABETALOL HYDROCHLORIDE 200 MG: 200 TABLET, FILM COATED ORAL at 05:58

## 2023-07-11 RX ADMIN — IBUPROFEN 800 MG: 800 TABLET, FILM COATED ORAL at 14:24

## 2023-07-11 NOTE — DISCHARGE SUMMARY
[facility]  Discharge Summary  Patient Name: Sophie Ashraf  : 1990  MRN: [id number]  CSN: [id number]    Discharge Summary    Date of Admission: 2023   Date of Discharge: 2023    Principle Discharge Dx: Active Hospital Problems    Diagnosis  POA    **Postpartum hypertension [O16.5]  Yes    Status post  delivery [Z98.891]  Not Applicable    Severe pre-eclampsia, third trimester [O14.13]  Yes     Added automatically from request for surgery [id number]      Supervision of high risk pregnancy in third trimester [O09.93]  Not Applicable     Low risk NIPS female,  Normal Carrier screening  Needs postpartum Pap        Hyperthyroidism affecting pregnancy in third trimester [O99.283, E05.90]  Yes     Graves disease  - stopped methimazole 2.5mg in October. TSH 0.874  2/3- TSH 1.69  Followed by Migel Faust: TSH q4 wks        Procedures Performed: Procedure(s):  DILATATION AND CURETTAGE WITH SUCTION   Brief History: Patient is a 32 y.o. now  who presented to labor and delivery on 2023 on postpartum day #4 after discharge home on postpartum day #3 with worsening control preeclampsia with severe features, acute on chronic blood loss anemia for readmission.   Hospital Course: Patient presented with report of headache and severe range blood pressures, lower extremity edema.  During her labor had been diagnosed with preeclampsia with severe features, was delivered via  due to failure to progress, and continued on magnesium sulfate [id number] postpartum with Dr. Ross.  She was not discharged on any medications for blood pressures due to good control.  Patient had also acute on chronic anemia.  Her hemoglobin preoperatively had been 10.3 and had trended down to 6.6.  On presentation her hemoglobin was 5.7.  She then underwent a blood transfusion with 2 units of packed red blood cells.  She had passed a moderate size clot and ultrasound was obtained suggesting  possible retained tissue versus clot in the uterus.  On hospital day #2  (due to eating evening of admission) she had suction D&C with Dr. Steve, with myself assisting/supervising.  There was minimal blood loss and small amount of suspected membranes and moderate clot removed from the uterus.  Her bleeding was scant since then.  Today on hospital day #3, her blood pressures are fairly well controlled.  She is now on labetalol 60 XL daily as well as labetalol 200 mg every 8 hours.  She had severe range blood pressure around 10 PM last evening, but noted feeling anxious during that time and had just received her scheduled labetalol.  Her blood pressure then improved to 130/90 without any short acting antihypertensives.  Her blood pressures have since been in the normal range to low mild range.  She denies any headache, chest pain, shortness of breath. She expressed the desire for discharge.  She had passed gas and was urinating normally.  She was eating a regular diet without difficulty.  She was ambulating well.  Her incision was clean, dry and intact.  Discharge instructions were given.  All questions were answered   Condition:  Discharge Activity:  Discharge Diet: Stable  Activity Instructions       Bathing Restrictions      Type of Restriction: Bathing    Bathing Restrictions: Other    Explain Bathing Restrictions: No soaking in bathtub for 4 weeks/ Showers are fine.    Driving Restrictions      Type of Restriction: Driving    Driving Restrictions: No Driving (Time Limited)    Length: Other    Indicate Length of Restriction: No driving for 1 week or while on narcotic pain medications. You must be able to quickly press on the brake before driving. Riding is car is fine.    Lifting Restrictions      Type of Restriction: Lifting    Lifting Restrictions: Other    Explain Lifing Restrictions: No lifting more than infant and baby carrier together for 6 weeks.    Pelvic Rest      Nothing in the vagina for 6 weeks to  include tampons, douching, or sexual intercourse.    Sexual Activity Restrictions      Type of Restriction: Sex    Explain Sexual Activity Restrictions: No sexual intercourse, tampon use, or douching for at least 6 weeks          Regular   Discharge Medications:    Your medication list        START taking these medications        Instructions Last Dose Given Next Dose Due   labetalol 200 MG tablet  Commonly known as: NORMODYNE      Take 1 tablet by mouth Every 8 (Eight) Hours.       NIFEdipine CC 60 MG 24 hr tablet  Commonly known as: ADALAT CC  Start taking on: July 12, 2023      Take 1 tablet by mouth Daily.              CONTINUE taking these medications        Instructions Last Dose Given Next Dose Due   famotidine 40 MG tablet  Commonly known as: PEPCID      Take 0.5 tablets by mouth Every Night.       ibuprofen 800 MG tablet  Commonly known as: ADVIL,MOTRIN      Take 1 tablet by mouth Every 8 (Eight) Hours.       oxyCODONE 5 MG immediate release tablet  Commonly known as: ROXICODONE      Take 1 tablet by mouth Every 4 (Four) Hours As Needed for Moderate Pain for up to 4 days.       pantoprazole 40 MG EC tablet  Commonly known as: PROTONIX      Take 1 tablet by mouth Every Morning.       Prenatal Vitamin 27-0.8 MG tablet      Take 1 tablet by mouth Daily.                 Where to Get Your Medications        These medications were sent to [facility]/pharmacy #[id number] - [address] [phone]  - [phone] [address] [address]      Phone: [phone]   labetalol 200 MG tablet  NIFEdipine CC 60 MG 24 hr tablet        Discharge Disposition: Home   Follow-up: Future Appointments   Date Time Provider Department Center   7/18/2023  9:45 AM Garima Ross MD [facility] None   8/22/2023  1:15 PM Garima Ross MD [facility] None   9/15/2023  9:15 AM Migel Faust APRN [facility]     1 week PP visit  6 week PP visit     <30 minutes  spent with the patient.      This document has been electronically signed by Elizabeth Cutler DO on July 11, 2023 15:32 CDT

## 2023-07-11 NOTE — PLAN OF CARE
Goal Outcome Evaluation:           Progress: no change  Outcome Evaluation: VSS, lochia light, voids and ambulates, pain controlled with scheduled medications, pt was anxious in the evening, Vistaril 25mg given, pt rested well. BP still high but not critical. Will continue to monitor BP

## 2023-07-11 NOTE — PLAN OF CARE
Goal Outcome Evaluation:  Plan of Care Reviewed With: patient, spouse        Progress: improving  Outcome Evaluation: VSS, voids and ambulated to the bathroom. denied any headache or blurred vision. Educate patient about preclampsia, hypertension and medications. pt ready for discharge

## 2023-07-12 LAB
BH BB BLOOD EXPIRATION DATE: NORMAL
BH BB BLOOD EXPIRATION DATE: NORMAL
BH BB BLOOD TYPE BARCODE: NORMAL
BH BB BLOOD TYPE BARCODE: NORMAL
BH BB DISPENSE STATUS: NORMAL
BH BB DISPENSE STATUS: NORMAL
BH BB PRODUCT CODE: NORMAL
BH BB PRODUCT CODE: NORMAL
BH BB UNIT NUMBER: NORMAL
BH BB UNIT NUMBER: NORMAL
CROSSMATCH INTERPRETATION: NORMAL
CROSSMATCH INTERPRETATION: NORMAL
UNIT  ABO: NORMAL
UNIT  ABO: NORMAL
UNIT  RH: NORMAL
UNIT  RH: NORMAL

## 2023-07-17 LAB — REF LAB TEST METHOD: NORMAL

## 2023-07-18 ENCOUNTER — POSTPARTUM VISIT (OUTPATIENT)
Dept: OBSTETRICS AND GYNECOLOGY | Facility: CLINIC | Age: 33
End: 2023-07-18
Payer: COMMERCIAL

## 2023-07-18 VITALS
WEIGHT: 177.6 LBS | HEIGHT: 66 IN | BODY MASS INDEX: 28.54 KG/M2 | SYSTOLIC BLOOD PRESSURE: 112 MMHG | DIASTOLIC BLOOD PRESSURE: 80 MMHG

## 2023-07-18 DIAGNOSIS — O14.13 SEVERE PRE-ECLAMPSIA, THIRD TRIMESTER: ICD-10-CM

## 2023-07-18 PROCEDURE — 0503F POSTPARTUM CARE VISIT: CPT | Performed by: STUDENT IN AN ORGANIZED HEALTH CARE EDUCATION/TRAINING PROGRAM

## 2023-07-18 NOTE — PROGRESS NOTES
"Postpartum visit      Sophie Ashraf is a 32 y.o.  s/p , Low Transverse on 23 at 38+5 secondary to failed IOL complicated by PreE with SF who presents today for postpartum check.  The patient states she is doing well. Denies HA, CP, SOA, RUQ pain.  Patient denies postpartum depression.  Menstrual cycles have not resumed.  Formulafeeding.  Desires undecided for contraception.  She has not resumed sexual intercourse.  Denies bowel or bladder issues.    23 PCS after failed IOL, PreE with SF. APGARs 9+9, Kavitha girl, 3180 g    Readmission -. C/b exacerbation of preE with SF, retained POC. Suction D&C with Dr. Steve during readmission 7/10/23. Required blood transfusion for acute on chronic anemia. DC on Procardia 60 XL qd and Labetalol 200 mg q8h.    BPs per log 114-138/71-99. Doing well on BP medication    PHYSICAL EXAM:    /80 (BP Location: Left arm, Patient Position: Sitting, Cuff Size: Adult)   Ht 167.6 cm (66\")   Wt 80.6 kg (177 lb 9.6 oz)   LMP 10/11/2022 (Within Days)   Breastfeeding No   BMI 28.67 kg/mý   Gen: well appearing, NAD  Abdomen: benign, no masses, soft, non-tender.  Incision: Well-healed, clean, dry, intact.  Extremities: No deep calf tenderness.  Postpartum Depression Screening Questionnaire: 0, no treatment indicated.    IMPRESSION/PLAN: Sophie Ashraf is a 32 y.o.  s/p , Low Transverse on 23.  Complicated by SARAH with SF and PP exacerbation as well as concern for retained POC, s/p D&C. Doing well.    - Recovered nicely from her delivery  - Contraception: undecided; may be candidate for progesterone only  - RTC 4-5 weeks for final PP visit  - Needs pp pap      This document has been electronically signed by Elizabeth Cutler DO on 2023 14:25 CDT     "

## 2023-08-06 RX ORDER — NIFEDIPINE 30 MG/1
30 TABLET, EXTENDED RELEASE ORAL DAILY
Qty: 30 TABLET | Refills: 2 | Status: SHIPPED | OUTPATIENT
Start: 2023-08-06

## 2023-08-22 ENCOUNTER — LAB (OUTPATIENT)
Dept: LAB | Facility: HOSPITAL | Age: 33
End: 2023-08-22
Payer: COMMERCIAL

## 2023-08-22 ENCOUNTER — POSTPARTUM VISIT (OUTPATIENT)
Dept: OBSTETRICS AND GYNECOLOGY | Facility: CLINIC | Age: 33
End: 2023-08-22
Payer: COMMERCIAL

## 2023-08-22 VITALS — DIASTOLIC BLOOD PRESSURE: 72 MMHG | SYSTOLIC BLOOD PRESSURE: 108 MMHG | BODY MASS INDEX: 28.6 KG/M2 | WEIGHT: 177.2 LBS

## 2023-08-22 DIAGNOSIS — O99.282 HYPERTHYROIDISM AFFECTING PREGNANCY IN SECOND TRIMESTER: ICD-10-CM

## 2023-08-22 DIAGNOSIS — E05.90 HYPERTHYROIDISM AFFECTING PREGNANCY IN SECOND TRIMESTER: ICD-10-CM

## 2023-08-22 DIAGNOSIS — E05.00 GRAVES DISEASE: ICD-10-CM

## 2023-08-22 PROBLEM — O99.283 HYPERTHYROIDISM AFFECTING PREGNANCY IN THIRD TRIMESTER: Status: RESOLVED | Noted: 2022-12-09 | Resolved: 2023-08-22

## 2023-08-22 PROBLEM — Z12.4 PAP SMEAR FOR CERVICAL CANCER SCREENING: Status: RESOLVED | Noted: 2023-07-04 | Resolved: 2023-08-22

## 2023-08-22 PROBLEM — O13.3 GESTATIONAL HYPERTENSION, THIRD TRIMESTER: Status: RESOLVED | Noted: 2023-07-04 | Resolved: 2023-08-22

## 2023-08-22 PROBLEM — K21.9 GASTROESOPHAGEAL REFLUX IN PREGNANCY IN THIRD TRIMESTER: Status: RESOLVED | Noted: 2023-06-16 | Resolved: 2023-08-22

## 2023-08-22 PROBLEM — O14.13 SEVERE PRE-ECLAMPSIA, THIRD TRIMESTER: Status: RESOLVED | Noted: 2023-07-04 | Resolved: 2023-08-22

## 2023-08-22 PROBLEM — O99.613 GASTROESOPHAGEAL REFLUX IN PREGNANCY IN THIRD TRIMESTER: Status: RESOLVED | Noted: 2023-06-16 | Resolved: 2023-08-22

## 2023-08-22 PROBLEM — O09.93 SUPERVISION OF HIGH RISK PREGNANCY IN THIRD TRIMESTER: Status: RESOLVED | Noted: 2023-01-06 | Resolved: 2023-08-22

## 2023-08-22 PROBLEM — O61.9 FAILED INDUCTION OF LABOR: Status: RESOLVED | Noted: 2023-07-04 | Resolved: 2023-08-22

## 2023-08-22 PROBLEM — Z98.891 STATUS POST CESAREAN DELIVERY: Status: RESOLVED | Noted: 2023-07-05 | Resolved: 2023-08-22

## 2023-08-22 LAB
T3 SERPL-MCNC: 93.9 NG/DL (ref 80–200)
T4 SERPL-MCNC: 6.49 MCG/DL (ref 4.5–11.7)
TSH SERPL DL<=0.05 MIU/L-ACNC: 1.4 UIU/ML (ref 0.27–4.2)

## 2023-08-22 PROCEDURE — 84436 ASSAY OF TOTAL THYROXINE: CPT

## 2023-08-22 PROCEDURE — 84443 ASSAY THYROID STIM HORMONE: CPT

## 2023-08-22 PROCEDURE — 0503F POSTPARTUM CARE VISIT: CPT | Performed by: OBSTETRICS & GYNECOLOGY

## 2023-08-22 PROCEDURE — 36415 COLL VENOUS BLD VENIPUNCTURE: CPT

## 2023-08-22 PROCEDURE — 84480 ASSAY TRIIODOTHYRONINE (T3): CPT

## 2023-08-22 NOTE — PROGRESS NOTES
[facility]  Obstetrics  Date of Service: 2023    CC: Postpartum visit      Sophie Ashraf is a 32 y.o.  s/p , Low Transverse on 2023 at 38w5d secondary to failed IOL secondary to preE w/ severe features who presents today for postpartum check.  She was readmitted - secondary to exacerbation of pre-eclampsia with severe features, retained POC.  She also required a suction D&C with Dr. Setve during readmission on 7/10 and required blood transfusion.  The patient states she is doing well.  Patient denies postpartum depression.  She reports that she did have some bleeding but resolved. Bottlefeeding.  She hasnot resumed sexual intercourse.  Denies bowel or bladder issues.    PHYSICAL EXAM:    /72 (BP Location: Left arm, Patient Position: Sitting, Cuff Size: Adult)   Wt 80.4 kg (177 lb 3.2 oz)   LMP 2023 (Within Days)   Breastfeeding No Comment: bottlefeeding  BMI 28.60 kg/mý   Abdomen: +BS, benign, no masses, soft, non-tender.  Incision: Well-healed, clean, dry, intact.  Extremities: No deep calf tenderness.  Postpartum Depression Screening Questionnaire: 1    IMPRESSION/PLAN: Sophie Ashraf is a 32 y.o.  s/p , Low Transverse on .  Doing well.   - Recovered nicely from her delivery  - Contraception: Vasectomy  - Restrictions lifted  - RTC for annual w/ pap smear    This document has been electronically signed by Garima Ross MD on 2023 13:22 CDT.

## 2023-09-15 ENCOUNTER — TELEMEDICINE (OUTPATIENT)
Dept: ENDOCRINOLOGY | Facility: CLINIC | Age: 33
End: 2023-09-15
Payer: COMMERCIAL

## 2023-09-15 ENCOUNTER — TELEPHONE (OUTPATIENT)
Dept: ENDOCRINOLOGY | Facility: CLINIC | Age: 33
End: 2023-09-15

## 2023-09-15 DIAGNOSIS — E06.3 HASHIMOTO'S THYROIDITIS: ICD-10-CM

## 2023-09-15 DIAGNOSIS — E05.90 HYPERTHYROIDISM: Primary | ICD-10-CM

## 2023-09-15 DIAGNOSIS — E05.00 GRAVES' DISEASE: ICD-10-CM

## 2023-09-15 NOTE — PROGRESS NOTES
Chief Complaint  Thyroid Problem    Subjective          Sophie Elaine Ashraf presents to [facility] ENDOCRINOLOGY  Hyperthyroidism       You have chosen to receive care through a telehealth visit.  Do you consent to use a video/audio connection for your medical care today? Yes      Mode of Visit: Video  Location of patient: home  You have chosen to receive care through a telehealth visit.  Does the patient consent to use a video/audio connection for your medical care today? Yes  The visit included audio and video interaction. No technical issues occurred during this visit.            32 year old female presents for follow up     Reason hyperthyroidism due to Graves --in remission       Diagnosed in Dec. 2020    Delivered baby July 5, 2023           Timing constant     Quality controlled    Severity moderate       Review of Systems - General ROS: negative           Objective   Vital Signs:   There were no vitals taken for this visit.    Physical Exam  Constitutional:       Appearance: Normal appearance.   Cardiovascular:      Rate and Rhythm: Regular rhythm.      Heart sounds: Normal heart sounds.   Pulmonary:      Breath sounds: Normal breath sounds.   Musculoskeletal:      Cervical back: Normal range of motion.   Neurological:      General: No focal deficit present.      Mental Status: She is alert.   Psychiatric:         Mood and Affect: Mood normal.         Thought Content: Thought content normal.         Judgment: Judgment normal.      Result Review :   The following data was reviewed by: SANJANA Russo on 02/04/2022:  Common labs          7/8/2023    23:46 7/9/2023    06:07 7/9/2023    16:22 7/10/2023    07:38   Common Labs   Glucose 98  90  91  83    BUN 13  12  14  11    Creatinine 0.77  0.69  0.88  0.72    Sodium 139  140  135  138    Potassium 4.5  4.6  4.3  4.2    Chloride 104  107  102  105    Calcium 9.2  8.7  9.0  8.7    Albumin 3.3  2.9  2.8  3.1    Total  Bilirubin 0.3  0.2  0.5  0.2    Alkaline Phosphatase 135  107  107  115    AST (SGOT) 152  145  117  81    ALT (SGPT) 86  90  90  82    WBC 11.99  9.36   10.32    Hemoglobin 6.6  5.7  7.5  8.0    Hematocrit 20.8  17.8  23.0  25.0    Platelets 200  179   209    Uric Acid 6.1                 Assessment and Plan    Diagnoses and all orders for this visit:    1. Hyperthyroidism (Primary)  -     T4, Free; Standing  -     TSH; Standing  -     T3; Standing    2. Graves' disease    3. Hashimoto's thyroiditis           Hyperthyroidism due to Graves' disease     Positive Hashimoto's antibodies     She is off methimazole and labs are still normal       Lab Results   Component Value Date    TSH 1.400 08/22/2023     Component      Latest Ref Rng 8/22/2023   T4, Total      4.50 - 11.70 mcg/dL 6.49    T3, Total      80.0 - 200.0 ng/dl 93.9              labs every 3 months, sooner if symptoms           Follow Up   No follow-ups on file.  Patient was given instructions and counseling regarding her condition or for health maintenance advice. Please see specific information pulled into the AVS if appropriate.         This document has been electronically signed by SANJANA Russo on September 15, 2023 09:23 CDT.

## (undated) DEVICE — RED RUBBER URETHRAL CATHETER: Brand: DOVER

## (undated) DEVICE — DRP SURG U/BUTT W/PCH BACK STRL

## (undated) DEVICE — GLV SURG SENSICARE POLYISPRN W/ALOE PF LF 6.5 GRN STRL

## (undated) DEVICE — GLV SURG TRIUMPH ORTHO W/ALOE PF LTX 6.5 STRL

## (undated) DEVICE — STCKNT STRL 6X48 IN

## (undated) DEVICE — TP SXN YANKR BLB TIP W/TBG 10F LF STRL

## (undated) DEVICE — TRY SPINE PENCAN 24GA X4IN

## (undated) DEVICE — GLV SURG TRIUMPH ORTHO W/ALOE PF LTX 8 STRL

## (undated) DEVICE — GLV SURG SENSICARE PI PF LF 7 GRN STRL

## (undated) DEVICE — VACURETTE CRV RIGD 8MM DISP